# Patient Record
Sex: MALE | Race: WHITE | NOT HISPANIC OR LATINO
[De-identification: names, ages, dates, MRNs, and addresses within clinical notes are randomized per-mention and may not be internally consistent; named-entity substitution may affect disease eponyms.]

---

## 2019-01-23 PROBLEM — Z00.00 ENCOUNTER FOR PREVENTIVE HEALTH EXAMINATION: Status: ACTIVE | Noted: 2019-01-23

## 2019-01-30 ENCOUNTER — APPOINTMENT (OUTPATIENT)
Dept: CARDIOLOGY | Facility: CLINIC | Age: 59
End: 2019-01-30
Payer: COMMERCIAL

## 2019-01-30 VITALS
OXYGEN SATURATION: 97 % | TEMPERATURE: 97.3 F | HEART RATE: 53 BPM | DIASTOLIC BLOOD PRESSURE: 79 MMHG | BODY MASS INDEX: 30.58 KG/M2 | HEIGHT: 70.5 IN | SYSTOLIC BLOOD PRESSURE: 138 MMHG | WEIGHT: 216 LBS

## 2019-01-30 DIAGNOSIS — Z83.79 FAMILY HISTORY OF OTHER DISEASES OF THE DIGESTIVE SYSTEM: ICD-10-CM

## 2019-01-30 DIAGNOSIS — M19.90 UNSPECIFIED OSTEOARTHRITIS, UNSPECIFIED SITE: ICD-10-CM

## 2019-01-30 DIAGNOSIS — Z86.69 PERSONAL HISTORY OF OTHER DISEASES OF THE NERVOUS SYSTEM AND SENSE ORGANS: ICD-10-CM

## 2019-01-30 DIAGNOSIS — Z80.9 FAMILY HISTORY OF MALIGNANT NEOPLASM, UNSPECIFIED: ICD-10-CM

## 2019-01-30 DIAGNOSIS — Z86.79 PERSONAL HISTORY OF OTHER DISEASES OF THE CIRCULATORY SYSTEM: ICD-10-CM

## 2019-01-30 DIAGNOSIS — Z82.49 FAMILY HISTORY OF ISCHEMIC HEART DISEASE AND OTHER DISEASES OF THE CIRCULATORY SYSTEM: ICD-10-CM

## 2019-01-30 DIAGNOSIS — Z84.89 FAMILY HISTORY OF OTHER SPECIFIED CONDITIONS: ICD-10-CM

## 2019-01-30 DIAGNOSIS — Z86.39 PERSONAL HISTORY OF OTHER ENDOCRINE, NUTRITIONAL AND METABOLIC DISEASE: ICD-10-CM

## 2019-01-30 DIAGNOSIS — Z87.39 PERSONAL HISTORY OF OTHER DISEASES OF THE MUSCULOSKELETAL SYSTEM AND CONNECTIVE TISSUE: ICD-10-CM

## 2019-01-30 DIAGNOSIS — Z86.19 PERSONAL HISTORY OF OTHER INFECTIOUS AND PARASITIC DISEASES: ICD-10-CM

## 2019-01-30 DIAGNOSIS — Z87.891 PERSONAL HISTORY OF NICOTINE DEPENDENCE: ICD-10-CM

## 2019-01-30 PROCEDURE — 99215 OFFICE O/P EST HI 40 MIN: CPT

## 2019-01-30 PROCEDURE — 93000 ELECTROCARDIOGRAM COMPLETE: CPT

## 2019-02-03 ENCOUNTER — NON-APPOINTMENT (OUTPATIENT)
Age: 59
End: 2019-02-03

## 2019-02-03 PROBLEM — Z86.79 HISTORY OF LEFT BUNDLE BRANCH BLOCK (LBBB): Status: RESOLVED | Noted: 2019-02-03 | Resolved: 2019-02-03

## 2019-02-03 PROBLEM — Z86.39 HISTORY OF HYPERLIPIDEMIA: Status: RESOLVED | Noted: 2019-02-03 | Resolved: 2019-02-03

## 2019-02-03 PROBLEM — Z80.9 FAMILY HISTORY OF MALIGNANT NEOPLASM: Status: ACTIVE | Noted: 2019-02-03

## 2019-02-03 PROBLEM — M19.90 OSTEOARTHRITIS: Status: RESOLVED | Noted: 2019-02-03 | Resolved: 2019-02-03

## 2019-02-03 PROBLEM — Z86.69 HISTORY OF SLEEP APNEA: Status: RESOLVED | Noted: 2019-02-03 | Resolved: 2019-02-03

## 2019-02-03 PROBLEM — Z87.891 FORMER SMOKER: Status: ACTIVE | Noted: 2019-02-03

## 2019-02-03 PROBLEM — Z87.39 HISTORY OF GANGLION CYST: Status: RESOLVED | Noted: 2019-02-03 | Resolved: 2019-02-03

## 2019-02-03 PROBLEM — Z86.19 HISTORY OF HERPES GENITALIS: Status: RESOLVED | Noted: 2019-02-03 | Resolved: 2019-02-03

## 2019-02-03 PROBLEM — Z82.49 FAMILY HISTORY OF VALVULAR HEART DISEASE: Status: ACTIVE | Noted: 2019-02-03

## 2019-02-03 PROBLEM — Z86.69 HISTORY OF TREMOR: Status: RESOLVED | Noted: 2019-02-03 | Resolved: 2019-02-03

## 2019-02-03 PROBLEM — Z83.79 FAMILY HISTORY OF INFLAMMATORY BOWEL DISEASE: Status: ACTIVE | Noted: 2019-02-03

## 2019-02-03 PROBLEM — Z86.79 HISTORY OF ESSENTIAL HYPERTENSION: Status: RESOLVED | Noted: 2019-02-03 | Resolved: 2019-02-03

## 2019-02-03 PROBLEM — Z86.39 HISTORY OF OBESITY: Status: RESOLVED | Noted: 2019-02-03 | Resolved: 2019-02-03

## 2019-02-03 PROBLEM — Z82.49 FAMILY HISTORY OF HYPERTENSION: Status: ACTIVE | Noted: 2019-02-03

## 2019-02-03 PROBLEM — Z84.89 FAMILY HISTORY OF NEOPLASM OF BRAIN: Status: ACTIVE | Noted: 2019-02-03

## 2019-02-03 RX ORDER — MULTIVITAMIN
TABLET ORAL
Refills: 0 | Status: ACTIVE | COMMUNITY

## 2019-02-03 RX ORDER — VALACYCLOVIR HYDROCHLORIDE 1 G/1
TABLET, FILM COATED ORAL
Refills: 0 | Status: ACTIVE | COMMUNITY

## 2019-02-03 RX ORDER — ASPIRIN 325 MG/1
TABLET, FILM COATED ORAL
Refills: 0 | Status: ACTIVE | COMMUNITY

## 2019-02-03 NOTE — PHYSICAL EXAM
[Normal Conjunctiva] : the conjunctiva exhibited no abnormalities [Auscultation Breath Sounds / Voice Sounds] : lungs were clear to auscultation bilaterally [Heart Rate And Rhythm] : heart rate and rhythm were normal [Heart Sounds] : normal S1 and S2 [Murmurs] : no murmurs present [Arterial Pulses Normal] : the arterial pulses were normal [Edema] : no peripheral edema present [Bowel Sounds] : normal bowel sounds [Abdomen Soft] : soft [Abnormal Walk] : normal gait [Cyanosis, Localized] : no localized cyanosis [] : no rash [Affect] : the affect was normal [FreeTextEntry1] : pleasant

## 2019-02-03 NOTE — HISTORY OF PRESENT ILLNESS
[FreeTextEntry1] : 58-year-old man\par Routine followup\par "I feel great" Mr. Meyer denies chest pain, shortness of breath, palpitations, ankle edema orthopnea or syncope. He is physically active exercising regularly without restriction or limitation.Occasional palpitations have not been progressive or severe.

## 2019-02-03 NOTE — DISCUSSION/SUMMARY
[FreeTextEntry1] : Atherosclerotic heart disease\par Atherosclerotic heart disease is stable. A 10/09 coronary arteriogram demonstrated a 50% LAD stenosis. The left circumflex and RCA were patent. The left ventriculogram demonstrated an ejection fraction of 57%. A 6/15 Riccardo can sestamibi study was normal. Left ventricular systolic function declined as reflected by a left ventricle ejection fraction of 40% on the gated sestamibi study. The left ventricular ejection fraction was 42% on the 7/15 echocardiogram. The left ventricle was dilated with an end-diastolic dimension of 6.1 cm.. In view of the lack of angina, above noninvasive studies and clinical course continued medical management is indicated at this time.\par \par I have recommended the following:\par 1 risk factor modification\par 2 continue present medical regimen\par 3 no further cardiac testing for this problem at this time\par \par \par \par Cardiomyopathy\par The working diagnosis is a nonischemic dilated cardiomyopathy. The degree of coronary disease  (10/09 coronary arteriogram 50% LAD patent circumflex and RCA) is inadequate to explain the degree of left ventricular systolic dysfunction. Left ventricular systolic function is depressed as reflected by a left ventricular ejection fraction of 40% on the 6/15 gated sestamibi study and 42% on the 7/15 echocardiogram. The left ventricle is dilated as indicated by a left ventricular end-diastolic dimension of 6.1 cm on the 7/15 echocardiogram.There is no clinical congestive heart failure. Beta blocker and ACE-I therapy are attractive. The doses of both enalapril and carvedilol may be increased if required. Echocardiography would be helpful to reassess left ventricular and valvular function.\par \par I have recommended the following:\par 1 continue present medical regimen\par 2 echocardiogram with next office evaluation\par \par \par Hypertension\par Hypertension is controlled on the present medical regimen. In the setting of left ventricular systolic dysfunction beta blocker and ACE-I./ARB therapy are attractive. The doses of both carvedilol and enalapril may be increased if required to obtain optimal levels.\par \par I have recommended the following:\par 1 low salt low fat low cholesterol diet. Regular aerobic exercise\par 2 continue present medical regimen\par 3 increase carvedilol and/ or enalapril doses if required to maintain optimal levels as discussed above.\par \par \par Hyperlipidemia\par Hyperlipidemia represents a risk factor for progressive atherosclerotic disease. The target LDL level with known atherosclerotic heart disease is about 70. HMG CoA reductase  inhibitor therapy has been effective. In 11/18  the  serum cholesterol level was 163 HDL 47 LDL 92 and triglycerides 143. The dose of Crestor may be increased if required to obtain optimal levels. Non-pharmacological therapy, specifically diet exercise and weight loss are emphasized as major aspects of treatment.\par \par I have recommended the following:\par 1 low salt low fat low cholesterol diet. Regular aerobic exercise\par 2 continue present medical regimen\par 3 target LDL level to about 70 as discussed above\par 4 increase Crestor dose if required to obtain optimal levels as noted above\par 5 routine laboratory studies including lipid profile through primary care Dr. Oviedo

## 2019-08-20 ENCOUNTER — APPOINTMENT (OUTPATIENT)
Dept: CARDIOLOGY | Facility: CLINIC | Age: 59
End: 2019-08-20
Payer: COMMERCIAL

## 2019-08-20 PROCEDURE — 93306 TTE W/DOPPLER COMPLETE: CPT

## 2019-08-23 ENCOUNTER — APPOINTMENT (OUTPATIENT)
Dept: CARDIOLOGY | Facility: CLINIC | Age: 59
End: 2019-08-23
Payer: COMMERCIAL

## 2019-08-23 VITALS
DIASTOLIC BLOOD PRESSURE: 80 MMHG | OXYGEN SATURATION: 97 % | WEIGHT: 219 LBS | BODY MASS INDEX: 30.98 KG/M2 | SYSTOLIC BLOOD PRESSURE: 120 MMHG | HEART RATE: 59 BPM

## 2019-08-23 DIAGNOSIS — Z78.9 OTHER SPECIFIED HEALTH STATUS: ICD-10-CM

## 2019-08-23 PROCEDURE — 93000 ELECTROCARDIOGRAM COMPLETE: CPT

## 2019-08-23 PROCEDURE — 99215 OFFICE O/P EST HI 40 MIN: CPT

## 2019-08-25 ENCOUNTER — NON-APPOINTMENT (OUTPATIENT)
Age: 59
End: 2019-08-25

## 2019-08-25 PROBLEM — Z78.9 SOCIAL ALCOHOL USE: Status: ACTIVE | Noted: 2019-08-25

## 2019-08-25 RX ORDER — CARVEDILOL 3.12 MG/1
TABLET, FILM COATED ORAL
Refills: 0 | Status: ACTIVE | COMMUNITY

## 2019-08-25 NOTE — DISCUSSION/SUMMARY
[FreeTextEntry1] : Atherosclerotic heart disease\par Atherosclerotic heart disease is stable. A 10/09 coronary arteriogram demonstrated a 50% LAD stenosis. The left circumflex and RCA were patent. The left ventriculogram demonstrated an ejection fraction of 57%. A 6/15 Riccardo can sestamibi study was normal. Left ventricular systolic function declined as reflected by a left ventricle ejection fraction of 40% on the 6/15  gated sestamibi study. The left ventricular ejection fraction was 41% on the 8/19  echocardiogram. The left ventricle was dilated with an end-diastolic dimension of 6.0cm.. In view of the lack of angina, above noninvasive studies and clinical course continued medical management is indicated at this time.\par \par I have recommended the following:\par 1 risk factor modification\par 2 continue present medical regimen\par 3 no further cardiac testing for this problem at this time\par 4. Anticipate stress nuclear study or CT coronary angiogram 2020\par \par \par \par Cardiomyopathy\par The working diagnosis is a nonischemic dilated cardiomyopathy. The degree of coronary disease  (10/09 coronary arteriogram 50% LAD patent circumflex and RCA) is inadequate to explain the degree of left ventricular systolic dysfunction. Left ventricular systolic function is depressed as reflected by a left ventricular ejection fraction of 40% on the 6/15 gated sestamibi study and 41% on the 8/19 echocardiogram. The left ventricle is dilated as indicated by a left ventricular end-diastolic dimension of 6.0 cm on the 8/19 echocardiogram.There is no clinical congestive heart failure. Beta blocker and ACE-I therapy are attractive. The doses of both enalapril and carvedilol may be increased if required. \par \par I have recommended the following:\par 1 continue present medical regimen\par 2 Anticipate stress nuclear study or CT coronary angiogram 2020\par \par \par \par \par Hypertension\par Hypertension is controlled on the present medical regimen. In the setting of left ventricular systolic dysfunction beta blocker and ACE-I./ARB therapy are attractive. The doses of both carvedilol and enalapril may be increased if required to obtain optimal levels.\par \par I have recommended the following:\par 1 low salt low fat low cholesterol diet. Regular aerobic exercise\par 2 continue present medical regimen\par 3 increase carvedilol and/ or enalapril doses if required to maintain optimal levels as discussed above.\par \par \par \par \par Hyperlipidemia\par Hyperlipidemia represents a risk factor for progressive atherosclerotic disease. The target LDL level with known atherosclerotic heart disease is about 70. HMG CoA reductase  inhibitor therapy has been effective. In 11/18  the  serum cholesterol level was 163 HDL 47 LDL 92 and triglycerides 143. The dose of Crestor may be increased if required to obtain optimal levels. Non-pharmacological therapy, specifically diet exercise and weight loss are emphasized as major aspects of treatment.\par \par I have recommended the following:\par 1 low salt low fat low cholesterol diet. Regular aerobic exercise\par 2 continue present medical regimen\par 3 target LDL level to about 70 as discussed above\par 4 increase Crestor dose if required to obtain optimal levels as noted above\par 5 routine laboratory studies including lipid profile through primary care Dr. Nash

## 2019-08-25 NOTE — HISTORY OF PRESENT ILLNESS
[FreeTextEntry1] : 58-year-old man\par Routine followup\par "I feel terrific." AJ has increased his activity/exercise with walking and biking. The right knee pain following a steroid injection has markedly improved he specifically denies chest pain, palpitations, shortness of breath or syncope.

## 2020-01-13 ENCOUNTER — RESULT REVIEW (OUTPATIENT)
Age: 60
End: 2020-01-13

## 2020-01-16 ENCOUNTER — APPOINTMENT (OUTPATIENT)
Dept: CARDIOLOGY | Facility: CLINIC | Age: 60
End: 2020-01-16
Payer: COMMERCIAL

## 2020-01-16 PROCEDURE — 93306 TTE W/DOPPLER COMPLETE: CPT

## 2020-01-22 ENCOUNTER — NON-APPOINTMENT (OUTPATIENT)
Age: 60
End: 2020-01-22

## 2020-01-22 ENCOUNTER — APPOINTMENT (OUTPATIENT)
Dept: CARDIOLOGY | Facility: CLINIC | Age: 60
End: 2020-01-22
Payer: COMMERCIAL

## 2020-01-22 VITALS
SYSTOLIC BLOOD PRESSURE: 110 MMHG | HEART RATE: 63 BPM | WEIGHT: 222 LBS | OXYGEN SATURATION: 96 % | DIASTOLIC BLOOD PRESSURE: 68 MMHG | BODY MASS INDEX: 31.4 KG/M2

## 2020-01-22 PROCEDURE — 36415 COLL VENOUS BLD VENIPUNCTURE: CPT

## 2020-01-22 PROCEDURE — 99215 OFFICE O/P EST HI 40 MIN: CPT

## 2020-01-22 PROCEDURE — 93000 ELECTROCARDIOGRAM COMPLETE: CPT

## 2020-01-23 NOTE — PHYSICAL EXAM
[Normal Conjunctiva] : the conjunctiva exhibited no abnormalities [Auscultation Breath Sounds / Voice Sounds] : lungs were clear to auscultation bilaterally [Heart Rate And Rhythm] : heart rate and rhythm were normal [Murmurs] : no murmurs present [Heart Sounds] : normal S1 and S2 [Arterial Pulses Normal] : the arterial pulses were normal [Edema] : no peripheral edema present [Bowel Sounds] : normal bowel sounds [Abdomen Soft] : soft [Abnormal Walk] : normal gait [Cyanosis, Localized] : no localized cyanosis [] : no ischemic changes [Affect] : the affect was normal [FreeTextEntry1] : feet warm, well. dorsalis pedis pulses +2 bilaterally

## 2020-01-23 NOTE — REVIEW OF SYSTEMS
[Feeling Fatigued] : feeling fatigued [Eyeglasses] : currently wearing eyeglasses [see HPI] : see HPI [Joint Pain] : joint pain [Negative] : Psychiatric [Shortness Of Breath] : shortness of breath

## 2020-01-23 NOTE — DISCUSSION/SUMMARY
[FreeTextEntry1] : Cardiomyopathy\par The working diagnosis is a nonischemic dilated cardiomyopathy. The degree of coronary disease  (10/09 coronary arteriogram 50% LAD patent circumflex and RCA normal perfusion 1/20 Lexiscan sesta mibi study )  is inadequate to explain the degree of left ventricular systolic dysfunction. Left ventricular systolic function is depressed as reflected by a left ventricular ejection fraction of  28 % on the 1/20  gated sestamibi study and 40% on the  1/20  echocardiogram. The left ventricle is dilated as indicated by a left ventricular end-diastolic dimension of 6.0 cm on the  1/20  echocardiogram.There is no clinical congestive heart failure. Beta blocker and ACE-I /ARB  therapy are attractive. Recent data have demonstrated the benefit of Neprilysin inhibition in patients with significant left ventricular systolic dysfunction . \par The presence of a left bundle branch block with a wide QRS complex (164 ms) would indicate a favorable response to resynchronization therapy/biventricular pacemaker implantation .\par \par I have recommended the following:\par 1 Discontinue enalapril\par 2 Entresto 49/51mg bid with further dose adjustment dictated by tolerance and response along with monitoring of electrolytes and renal function\par 3. Consideration for biventricular pacemaker in the future depending on the patient's clinical course, response to the above measures.\par \par \par \par Atherosclerotic heart disease\par Atherosclerotic heart disease is stable. A 10/09 coronary arteriogram demonstrated a 50% LAD stenosis. The left circumflex and RCA were patent. The left ventriculogram demonstrated an ejection fraction of 57%.  A1/20 Lexiscan sestamibi study demonstrated normal perfusion with no ischemia or infarct.l. Left ventricular systolic function declined as reflected by a left ventricle ejection fraction of 28 % on the 1/20   gated sestamibi study and 40 % on the 1/20 echocardiogram.. In view of the lack of angina, above noninvasive studies and clinical course continued medical management is indicated at this time.\par \par I have recommended the following:\par 1 risk factor modification\par 2 continue present medical regimen\par 3 no further cardiac testing for this problem at this time\par \par \par \par \par \par Left bundle branch block\par Mr. Meyer has a known chronic left bundle branch block. The presence of a left bundle branch block is common in the setting of a cardiomyopathy. As previously noted a left bundle branch block with wide QRS duration ( 164 ms) would indicate a high likelihood of a favorable response to resynchronization therapy/biventricular pacemaker implantation.\par \par I have recommended the following\par 1. Consideration for biventricular pacemaker implantation dependent on the patient's clinical course as discussed above.\par \par \par Hypertension\par Hypertension is controlled on the present medical regimen. In the setting of left ventricular systolic dysfunction beta blocker and ACE-I./ARB and Neprilysin inhibition   therapy are attractive. .\par \par I have recommended the following:\par 1 low salt low fat low cholesterol diet. Regular aerobic exercise\par 2 Entreso to replace enalapril as discussed above\par .\par \par \par \par \par Hyperlipidemia\par Hyperlipidemia represents a risk factor for progressive atherosclerotic disease. The target LDL level with known atherosclerotic heart disease is about 70. HMG CoA reductase  inhibitor therapy has been effective. In 12/19  the  serum cholesterol level was 204 HDL 52  LDL 93  and triglycerides 293. The dose of Crestor may be increased in an effort  to obtain optimal levels. Non-pharmacological therapy, specifically diet exercise and weight loss are emphasized as major aspects of treatment.\par \par I have recommended the following:\par 1 low salt low fat low cholesterol diet. Regular aerobic exercise\par 2 Increase Crestor from 20 to 40 mg daily\par 3 target LDL level to about 70 as discussed above\par 4 routine laboratory studies including lipid profile through primary care Dr. Nash\par \par Obesity\par Obesity exacerbates Adarsh's cardiovascular issues.. Today Mr. Meyer is 5 feet 9 inches tall and weighs 222 lbs. he is gained 3 pounds in the last 5 months. Diet exercise and weight loss are advised.\par \par \par \par The diagnosis, prognosis, risks options and alternatives were explained at length to the patient. All questions were answered. Issues discussed included cardio myopathy/left ventricular systolic dysfunction, heart failure, hyperlipidemia and hypertension.\par Counseling and coordination of care\par Time was a significant factor for this patient encounter. Total time spent with the patient was 40 minutes. 50% of the time was devoted to counseling and coordination of care.

## 2020-01-23 NOTE — HISTORY OF PRESENT ILLNESS
[FreeTextEntry1] : 59-year-old man\par Routine visit following noninvasive cardiac studies\par \par 1/20 Lexiscan sestamibi study demonstrated no ischemia or infarct. Left ejection fraction 28%\par 1/20 echocardiogram left ventricular end-diastolic dimension 5.98 cm left ventricular l ejection fraction 40%\par \par  Mitch complains of mild dyspnea on exertion. No chest pain. No palpitations. No ankle edema. No syncope.

## 2020-03-02 ENCOUNTER — APPOINTMENT (OUTPATIENT)
Dept: CARDIOLOGY | Facility: CLINIC | Age: 60
End: 2020-03-02

## 2020-03-03 ENCOUNTER — APPOINTMENT (OUTPATIENT)
Dept: CARDIOLOGY | Facility: CLINIC | Age: 60
End: 2020-03-03
Payer: COMMERCIAL

## 2020-03-03 VITALS
BODY MASS INDEX: 32.11 KG/M2 | WEIGHT: 227 LBS | HEART RATE: 55 BPM | SYSTOLIC BLOOD PRESSURE: 114 MMHG | OXYGEN SATURATION: 97 % | DIASTOLIC BLOOD PRESSURE: 70 MMHG

## 2020-03-03 PROCEDURE — 99215 OFFICE O/P EST HI 40 MIN: CPT

## 2020-03-04 NOTE — HISTORY OF PRESENT ILLNESS
[FreeTextEntry1] : 59-year-old man\par Routine followup\par "I feel okay "  Breathing seems to be "a little bit better "  No chest pain. No palpitations. No ankle edema. No orthopnea. No syncope. No dizziness\par

## 2020-03-04 NOTE — DISCUSSION/SUMMARY
[FreeTextEntry1] : Cardiomyopathy\par The working diagnosis is a nonischemic dilated cardiomyopathy. The degree of coronary disease  (10/09 coronary arteriogram 50% LAD patent circumflex and RCA normal perfusion 1/20 Lexiscan sesta mibi study )  is inadequate to explain the degree of left ventricular systolic dysfunction. Left ventricular systolic function is depressed as reflected by a left ventricular ejection fraction of  28 % on the 1/20  gated sestamibi study and 40% on the  1/20  echocardiogram. The left ventricle is dilated as indicated by a left ventricular end-diastolic dimension of 6.0 cm on the  1/20  echocardiogram.There is no clinical congestive heart failure. Beta blocker and ACE-I /ARB  therapy are attractive. Recent data have demonstrated the benefit of Neprilysin inhibition in patients with significant left ventricular systolic dysfunction .\par Relatively low blood pressure levels may make higher doses of Entresto problematic. \par The presence of a left bundle branch block with a wide QRS complex (164 ms) would indicate a favorable response to resynchronization therapy/biventricular pacemaker implantation .\par \par I have recommended the following:\par 1 Continue present medical regimen at this time\par 2. Consideration for increase Entresto  dose to 97/103 bid  dependent on the patient's hemodynamics and clinical course\par 3. Consideration for biventricular pacemaker in the future depending on the patient's clinical course, response to the above measures.\par \par \par \par Atherosclerotic heart disease\par Atherosclerotic heart disease is stable. A 10/09 coronary arteriogram demonstrated a 50% LAD stenosis. The left circumflex and RCA were patent. The left ventriculogram demonstrated an ejection fraction of 57%.  A1/20 Lexiscan sestamibi study demonstrated normal perfusion with no ischemia or infarct.l. Left ventricular systolic function declined as reflected by a left ventricle ejection fraction of 28 % on the 1/20   gated sestamibi study and 40 % on the 1/20 echocardiogram.. In view of the lack of angina, above noninvasive studies and clinical course continued medical management is indicated at this time.\par \par I have recommended the following:\par 1 risk factor modification\par 2 continue present medical regimen\par 3 no further cardiac testing for this problem at this time\par \par \par \par \par \par Left bundle branch block\par Mr. Meyer has a known chronic left bundle branch block. The presence of a left bundle branch block is common in the setting of a cardiomyopathy. As previously noted a left bundle branch block with wide QRS duration ( 164 ms) would indicate a high likelihood of a favorable response to resynchronization therapy/biventricular pacemaker implantation.\par \par I have recommended the following\par 1. Consideration for biventricular pacemaker implantation dependent on the patient's clinical course as discussed above.\par \par \par Hypertension\par Hypertension is controlled on the present medical regimen. In the setting of left ventricular systolic dysfunction beta blocker and ACE-I./ARB and Neprilysin inhibition   therapy are attractive. .\par \par I have recommended the following:\par 1 low salt low fat low cholesterol diet. Regular aerobic exercise\par 2 continue the present  medical regimen.\par \par \par \par \par Hyperlipidemia\par Hyperlipidemia represents a risk factor for progressive atherosclerotic disease. The target LDL level with known atherosclerotic heart disease is about 70. HMG CoA reductase  inhibitor therapy has been effective. In 12/19  the  serum cholesterol level was 204 HDL 52  LDL 93  and triglycerides 293. Non pharmacological  therapy, specifically diet exercise and weight loss are emphasized as major aspects of treatment.\par \par I have recommended the following:\par 1 low salt low fat low cholesterol diet. Regular aerobic exercise\par 2 continue the present medical regimen\par 3 routine laboratory studies including lipid profile through primary care Dr. Nash\par \par Obesity\par Obesity exacerbates Adarsh's cardiovascular issues.. Today Mr. Meyer is 5 feet 9 inches tall and weighs 227 lbs. he is gained 5   pounds in the last 6 weeks  and 8 pounds in the last 7 months.. Diet exercise and weight loss are advised.\par \par \par \par The diagnosis, prognosis, risks options and alternatives were explained at length to the patient. All questions were answered. Issues discussed included cardio myopathy/left ventricular systolic dysfunction, heart failure, hyperlipidemia and hypertension.\par Counseling and coordination of care\par Time was a significant factor for this patient encounter. Total time spent with the patient was 40 minutes. 50% of the time was devoted to counseling and coordination of care.

## 2020-04-28 ENCOUNTER — APPOINTMENT (OUTPATIENT)
Dept: CARDIOLOGY | Facility: CLINIC | Age: 60
End: 2020-04-28
Payer: COMMERCIAL

## 2020-04-28 VITALS
SYSTOLIC BLOOD PRESSURE: 127 MMHG | WEIGHT: 231.06 LBS | DIASTOLIC BLOOD PRESSURE: 81 MMHG | HEART RATE: 51 BPM | OXYGEN SATURATION: 97 % | BODY MASS INDEX: 32.69 KG/M2

## 2020-04-28 PROCEDURE — 99215 OFFICE O/P EST HI 40 MIN: CPT

## 2020-04-28 PROCEDURE — 93000 ELECTROCARDIOGRAM COMPLETE: CPT

## 2020-04-29 ENCOUNTER — NON-APPOINTMENT (OUTPATIENT)
Age: 60
End: 2020-04-29

## 2020-04-29 RX ORDER — FLUTICASONE PROPIONATE 50 MCG
50 SPRAY, SUSPENSION NASAL
Refills: 0 | Status: ACTIVE | COMMUNITY

## 2020-04-29 NOTE — HISTORY OF PRESENT ILLNESS
[FreeTextEntry1] : 59-year-old man\par Unscheduled visit\par Adarsh called today with complaints of chest pain and shortness of breath. 7-10 days ago he was treated by his primary care physician Dr. Nash for sinus congestion/allergies manifested by nasal and upper chest "congestion. "  The last 2 days he is experiencing shortness of breath on exertion and chest "pressure"\par No ankle edema. No orthopnea. No palpitations. No syncope.  No cough. No fever.

## 2020-04-29 NOTE — DISCUSSION/SUMMARY
[FreeTextEntry1] : Chest pain/shortness of breath\par The working diagnosis is musculoskeletal chest pain and dyspnea secondary to deconditioning and seasonal allergies. The history is consistent with this diagnosis. The differential diagnosis would include congestive heart failure secondary to heart failure with reduced ejection fraction (28% 1/20 gated  sestamibi study and 40% 1/20 echocardiogram) secondary to a nonischemic cardiomyopathy. However, the physical findings do not support this diagnosis. A 1/20 lexiscan sestamibi study demonstrated normal perfusion arguing against the presence of acute myocardial ischemia as a cause for the patient's symptoms The presence of a left bundle branch block precludes ECG assessment regarding acute myocardial ischemia.  The presence of the corona virus pandemic will  influence  decisions  regarding timing of cardiac testing.\par \par \par I have recommended the following:\par 1 continue present medical regimen\par 2. Low salt low fat low caloric diet. Regular aerobic exercise and weight loss\par 3. No further cardiac testing for this problem at this time\par 4. 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 The degree of coronary disease  (10/09 coronary arteriogram 50% LAD patent circumflex and RCA normal perfusion 1/20 Lexiscan sesta mibi study )  is inadequate to explain the degree of left ventricular systolic dysfunction. Left ventricular systolic function is depressed as reflected by a left ventricular ejection fraction of  28 % on the 1/20  gated sestamibi study and 40% on the  1/20  echocardiogram. The left ventricle is dilated as indicated by a left ventricular end-diastolic dimension of 6.0 cm on the  1/20  echocardiogram.There is no clinical congestive heart failure. Beta blocker and ACE-I /ARB  therapy are attractive. Recent data have demonstrated the benefit of Neprilysin inhibition in patients with significant left ventricular systolic dysfunction .\par Relatively low blood pressure levels may make higher doses of Entresto problematic. \par The presence of a left bundle branch block with a wide QRS complex (164 ms) would indicate a favorable response to resynchronization therapy/biventricular pacemaker implantation .\par \par I have recommended the following:\par 1 Continue present medical regimen at this time\par 2. Consideration for increase Entresto  dose to 97/103 bid  dependent on the patient's hemodynamics and clinical course\par 3. Consideration for biventricular pacemaker in the future depending on the patient's clinical course, response to the above measures.\par 4. anticipate echocardiogram later 2020\par \par \par \par Atherosclerotic heart disease\par Atherosclerotic heart disease is stable. A 10/09 coronary arteriogram demonstrated a 50% LAD stenosis. The left circumflex and RCA were patent. The left ventriculogram demonstrated an ejection fraction of 57%.  A1/20 Lexiscan sestamibi study demonstrated normal perfusion with no ischemia or infarct.l. Left ventricular systolic function declined as reflected by a left ventricle ejection fraction of 28 % on the 1/20   gated sestamibi study and 40 % on the 1/20 echocardiogram.. In view of the lack of angina, above noninvasive studies and clinical course continued medical management is indicated at this time.\par \par I have recommended the following:\par 1 risk factor modification\par 2 continue present medical regimen\par 3 no further cardiac testing for this problem at this time\par 4.anticipate echocardiogram later 2020.\par \par \par \par \par \par Left bundle branch block\par Mr. Meyer has a known chronic left bundle branch block. The presence of a left bundle branch block is common in the setting of a cardiomyopathy. As previously noted a left bundle branch block with wide QRS duration ( 152 ms) would indicate a high likelihood of a favorable response to resynchronization therapy/biventricular pacemaker implantation.\par \par I have recommended the following\par 1. Consideration for biventricular pacemaker implantation dependent on the patient's clinical course as discussed above.\par \par \par Hypertension\par Hypertension is controlled on the present medical regimen. In the setting of left ventricular systolic dysfunction beta blocker and ACE-I./ARB and Neprilysin inhibition   therapy are attractive. .\par \par I have recommended the following:\par 1 low salt low fat low cholesterol diet. Regular aerobic exercise\par 2 continue the present  medical regimen.\par \par \par \par \par Hyperlipidemia\par Hyperlipidemia represents a risk factor for progressive atherosclerotic disease. The target LDL level with known atherosclerotic heart disease is about 70. HMG CoA reductase  inhibitor therapy has been effective. In 12/19  the  serum cholesterol level was 204 HDL 52  LDL 93  and triglycerides 293. Non pharmacological  therapy, specifically diet exercise and weight loss are emphasized as major aspects of treatment.\par \par I have recommended the following:\par 1 low salt low fat low cholesterol diet. Regular aerobic exercise\par 2 continue the present medical regimen\par 3 routine laboratory studies including lipid profile through primary care Dr. Nash\par \par Obesity\par Obesity exacerbates Adarsh's cardiovascular issues.. Today Mr. Meyer is 5 feet 9 inches tall and weighs 231  lbs. he is gained 4   pounds in the last 6 weeks  and  12   pounds in the last  9  months.. Diet exercise and weight loss are advised.\par \par \par \par The diagnosis, prognosis, risks options and alternatives were explained at length to the patient. All questions were answered. Issues discussed included cardio myopathy/left ventricular systolic dysfunction, heart failure, hyperlipidemia and hypertension.\par Counseling and coordination of care\par Time was a significant factor for this patient encounter. Total time spent with the patient was 40 minutes. 50% of the time was devoted to counseling and coordination of care.

## 2020-04-29 NOTE — PHYSICAL EXAM
[Normal Conjunctiva] : the conjunctiva exhibited no abnormalities [Auscultation Breath Sounds / Voice Sounds] : lungs were clear to auscultation bilaterally [Heart Rate And Rhythm] : heart rate and rhythm were normal [Heart Sounds] : normal S1 and S2 [Arterial Pulses Normal] : the arterial pulses were normal [Murmurs] : no murmurs present [Edema] : no peripheral edema present [Abdomen Soft] : soft [Bowel Sounds] : normal bowel sounds [Abnormal Walk] : normal gait [Cyanosis, Localized] : no localized cyanosis [Affect] : the affect was normal [] : no rash [FreeTextEntry1] : pleasant

## 2020-06-03 ENCOUNTER — APPOINTMENT (OUTPATIENT)
Dept: CARDIOLOGY | Facility: CLINIC | Age: 60
End: 2020-06-03
Payer: COMMERCIAL

## 2020-06-03 VITALS
WEIGHT: 229 LBS | OXYGEN SATURATION: 96 % | SYSTOLIC BLOOD PRESSURE: 128 MMHG | BODY MASS INDEX: 32.39 KG/M2 | HEART RATE: 62 BPM | DIASTOLIC BLOOD PRESSURE: 80 MMHG

## 2020-06-03 PROCEDURE — 99214 OFFICE O/P EST MOD 30 MIN: CPT

## 2020-06-04 NOTE — DISCUSSION/SUMMARY
[FreeTextEntry1] : Cardiomyopathy\par The working diagnosis is a nonischemic dilated cardiomyopathy. The degree of coronary disease  (10/09 coronary arteriogram 50% LAD patent circumflex and RCA normal perfusion 1/20 Lexiscan sesta mibi study )  is inadequate to explain the degree of left ventricular systolic dysfunction. Left ventricular systolic function is depressed as reflected by a left ventricular ejection fraction of  28 % on the 1/20  gated sestamibi study and 40% on the  1/20  echocardiogram. The left ventricle is dilated as indicated by a left ventricular end-diastolic dimension of 6.0 cm on the  1/20  echocardiogram.There is no clinical congestive heart failure. Beta blocker and ACE-I /ARB  therapy are attractive. Recent data have demonstrated the benefit of Neprilysin inhibition in patients with significant left ventricular systolic dysfunction .\par Relatively low blood pressure levels may make higher doses of Entresto problematic. \par The presence of a left bundle branch block with a wide QRS complex (164 ms) would indicate a favorable response to resynchronization therapy/biventricular pacemaker implantation .\par \par I have recommended the following:\par 1 Continue present medical regimen at this time\par 2. Consideration for increase Entresto  dose to 97/103 bid  dependent on the patient's hemodynamics and clinical course\par 3. Consideration for biventricular pacemaker in the future depending on the patient's clinical course, response to the above measures.\par 4 echocardiogram with next office evaluation in  6 months\par \par \par \par Atherosclerotic heart disease\par Atherosclerotic heart disease is stable. A 10/09 coronary arteriogram demonstrated a 50% LAD stenosis. The left circumflex and RCA were patent. The left ventriculogram demonstrated an ejection fraction of 57%.  A1/20 Lexiscan sestamibi study demonstrated normal perfusion with no ischemia or infarct.l. Left ventricular systolic function declined as reflected by a left ventricle ejection fraction of 28 % on the 1/20   gated sestamibi study and 40 % on the 1/20 echocardiogram.. In view of the lack of angina, above noninvasive studies and clinical course continued medical management is indicated at this time.\par \par I have recommended the following:\par 1 risk factor modification\par 2 continue present medical regimen\par 3 no further cardiac testing for this problem at this time\par 4. echocardiogram with next office evaluation in 6 months.\par \par \par \par \par \par Left bundle branch block\par Mr. Meyer has a known chronic left bundle branch block. The presence of a left bundle branch block is common in the setting of a cardiomyopathy. As previously noted a left bundle branch block with wide QRS duration ( 152 ms) would indicate a high likelihood of a favorable response to resynchronization therapy/biventricular pacemaker implantation.\par \par I have recommended the following\par 1. Consideration for biventricular pacemaker implantation dependent on the patient's clinical course as discussed above.\par \par \par Hypertension\par Hypertension is controlled on the present medical regimen. In the setting of left ventricular systolic dysfunction beta blocker and ACE-I./ARB and Neprilysin inhibition   therapy are attractive. .\par \par I have recommended the following:\par 1 low salt low fat low cholesterol diet. Regular aerobic exercise\par 2 continue the present  medical regimen.\par \par \par \par \par Hyperlipidemia\par Hyperlipidemia represents a risk factor for progressive atherosclerotic disease. The target LDL level with known atherosclerotic heart disease is about 70. HMG CoA reductase  inhibitor therapy has been effective. In 12/19  the  serum cholesterol level was 204 HDL 52  LDL 93  and triglycerides 293. Non pharmacological  therapy, specifically diet exercise and weight loss are emphasized as major aspects of treatment.\par \par I have recommended the following:\par 1 low salt low fat low cholesterol diet. Regular aerobic exercise\par 2 continue the present medical regimen\par 3 routine laboratory studies including lipid profile through primary care Dr. Nash\par \par Obesity\par Obesity exacerbates Adarsh's cardiovascular issues.. Today Mr. Meyer is 5 feet 9 inches tall and weighs 229  lbs. Diet exercise and weight loss are advised.\par \par \par \par The diagnosis, prognosis, risks options and alternatives were explained at length to the patient. All questions were answered. Issues discussed included cardio myopathy/left ventricular systolic dysfunction, heart failure, hyperlipidemia and hypertension.\par \par \par Counseling and coordination of care\par Time was a significant factor for this patient encounter. Total time spent with the patient was  25  minutes. 50% of the time was devoted to counseling and coordination of care.

## 2020-06-04 NOTE — HISTORY OF PRESENT ILLNESS
[FreeTextEntry1] : 59-year-old man\par Routine followup\par "I feel fabulous " AJ has been exercising riding his bicycle for miles without any restriction or limitation. No chest pain. No shortness of breath. No palpitations. No syncope.

## 2021-01-27 ENCOUNTER — APPOINTMENT (OUTPATIENT)
Dept: CARDIOLOGY | Facility: CLINIC | Age: 61
End: 2021-01-27
Payer: COMMERCIAL

## 2021-01-27 ENCOUNTER — NON-APPOINTMENT (OUTPATIENT)
Age: 61
End: 2021-01-27

## 2021-01-27 PROCEDURE — 93306 TTE W/DOPPLER COMPLETE: CPT

## 2021-01-27 PROCEDURE — 99072 ADDL SUPL MATRL&STAF TM PHE: CPT

## 2021-02-12 ENCOUNTER — APPOINTMENT (OUTPATIENT)
Dept: CARDIOLOGY | Facility: CLINIC | Age: 61
End: 2021-02-12
Payer: COMMERCIAL

## 2021-02-12 PROCEDURE — 99441: CPT

## 2021-02-16 ENCOUNTER — NON-APPOINTMENT (OUTPATIENT)
Age: 61
End: 2021-02-16

## 2021-03-16 ENCOUNTER — APPOINTMENT (OUTPATIENT)
Dept: CARDIOLOGY | Facility: CLINIC | Age: 61
End: 2021-03-16

## 2021-03-22 ENCOUNTER — APPOINTMENT (OUTPATIENT)
Dept: CARDIOLOGY | Facility: CLINIC | Age: 61
End: 2021-03-22
Payer: COMMERCIAL

## 2021-03-22 VITALS
WEIGHT: 189 LBS | DIASTOLIC BLOOD PRESSURE: 66 MMHG | BODY MASS INDEX: 26.74 KG/M2 | TEMPERATURE: 97.9 F | SYSTOLIC BLOOD PRESSURE: 122 MMHG | RESPIRATION RATE: 13 BRPM | OXYGEN SATURATION: 99 % | HEART RATE: 54 BPM

## 2021-03-22 PROCEDURE — 99072 ADDL SUPL MATRL&STAF TM PHE: CPT

## 2021-03-22 PROCEDURE — 93000 ELECTROCARDIOGRAM COMPLETE: CPT

## 2021-03-22 PROCEDURE — 99214 OFFICE O/P EST MOD 30 MIN: CPT

## 2021-03-23 ENCOUNTER — NON-APPOINTMENT (OUTPATIENT)
Age: 61
End: 2021-03-23

## 2021-03-23 NOTE — DISCUSSION/SUMMARY
[FreeTextEntry1] : Cardiomyopathy\par The working diagnosis is a nonischemic dilated cardiomyopathy. The degree of coronary disease  (10/09 coronary arteriogram 50% LAD patent circumflex and RCA normal perfusion 1/20 Lexiscan sesta mibi study )  is inadequate to explain the degree of left ventricular systolic dysfunction. Left ventricular systolic function is depressed as reflected by a left ventricular ejection fraction of  28 % on the 1/20  gated sestamibi study and 41% on the  1/21  echocardiogram. There is no clinical congestive heart failure. Beta blocker and ACE-I /ARB  therapy are attractive. Recent data have demonstrated the benefit of Neprilysin inhibition in patients with significant left ventricular systolic dysfunction .\par Relatively low blood pressure levels may make higher doses of Entresto problematic. \par The presence of a left bundle branch block with a wide QRS complex (160 ms) would indicate a favorable response to resynchronization therapy/biventricular pacemaker implantation .\par \par I have recommended the following:\par 1 Continue the  present medical regimen at this time\par 2. Consideration for increase Entresto  dose to 97/103 bid  dependent on the patient's hemodynamics and clinical course\par 3. Consideration for biventricular pacemaker in the future depending on the patient's clinical course, response to the above measures.\par 4. No further cardiac testing for this problem at this time\par \par \par \par Atherosclerotic heart disease\par Atherosclerotic heart disease is stable. A 10/09 coronary arteriogram demonstrated a 50% LAD stenosis. The left circumflex and RCA were patent. The left ventriculogram demonstrated an ejection fraction of 57%.  A1/20 Lexiscan sestamibi study demonstrated normal perfusion with no ischemia or infarct.  Left ventricular systolic function  is depressed  as reflected by a left ventricle ejection fraction of 28 % on the 1/20   gated sestamibi study and 41 % on the 1/21 echocardiogram.. In view of the lack of angina, above noninvasive studies and clinical course continued medical management is indicated at this time.\par \par I have recommended the following:\par 1 risk factor modification\par 2 continue the present medical regimen\par 3 no further cardiac testing for this problem at this time\par \par \par \par \par \par \par Left bundle branch block\par Mr. Meyer has a known chronic left bundle branch block. The presence of a left bundle branch block is common in the setting of a cardiomyopathy. As previously noted a left bundle branch block with wide QRS duration ( 160 ms) would indicate a high likelihood of a favorable response to resynchronization therapy/biventricular pacemaker implantation.\par \par I have recommended the following\par 1. Consideration for biventricular pacemaker implantation dependent on the patient's clinical course as discussed above.\par \par \par Hypertension\par Hypertension is controlled on the present medical regimen. In the setting of left ventricular systolic dysfunction beta blocker and ACE-I./ARB and Neprilysin inhibition    are attractive. .\par \par I have recommended the following:\par 1 low salt low fat low cholesterol diet. Regular aerobic exercise\par 2 continue the present  medical regimen.\par \par \par \par \par Hyperlipidemia\par Hyperlipidemia represents a risk factor for progressive atherosclerotic disease. The target LDL level with known atherosclerotic heart disease is about 70. HMG CoA reductase  inhibitor therapy has been effective. In 12/19  the  serum cholesterol level was 204 HDL 52  LDL 93  and triglycerides 293.  More recent lipid levels are not available for review  . Non pharmacological  therapy, specifically diet exercise and weight loss are emphasized as major aspects of treatment.\par \par I have recommended the following:\par 1 low salt low fat low cholesterol diet. Regular aerobic exercise\par 2 continue the present medical regimen\par 3 routine laboratory studies including lipid profile through primary care Dr. Nash\par \par \par \par Obesity\par Obesity exacerbates Adarsh's cardiovascular issues.. Today Mr. Meyer is 5 feet 9 inches tall and weighs 229  lbs. Diet exercise and weight loss are advised.\par \par \par \par The diagnosis, prognosis, risks options and alternatives were explained at length to the patient. All questions were answered. Issues discussed included cardio myopathy/left ventricular systolic dysfunction, heart failure, hyperlipidemia  hypertension  noninvasive cardiac testing diet and exercise.\par \par \par Counseling and coordination of care\par Time was a significant factor for this patient encounter. Total time spent with the patient was  30   minutes. 50% of the time was devoted to counseling and coordination of care.

## 2021-03-23 NOTE — HISTORY OF PRESENT ILLNESS
[FreeTextEntry1] : 60-year-old man\par Routine followup\par Through dietary changes  AJ  has lost 40 pounds since his last visit here in 6/20. No chest pain. No shortness of breath. No palpitations. No peripheral edema. No syncope.

## 2021-07-28 ENCOUNTER — HOSPITAL ENCOUNTER (EMERGENCY)
Dept: HOSPITAL 74 - FER | Age: 61
Discharge: HOME | End: 2021-07-28
Payer: COMMERCIAL

## 2021-07-28 VITALS — HEART RATE: 86 BPM | SYSTOLIC BLOOD PRESSURE: 132 MMHG | DIASTOLIC BLOOD PRESSURE: 86 MMHG | TEMPERATURE: 100.2 F

## 2021-07-28 VITALS — BODY MASS INDEX: 26.2 KG/M2

## 2021-07-28 DIAGNOSIS — M79.10: ICD-10-CM

## 2021-07-28 DIAGNOSIS — Z11.52: ICD-10-CM

## 2021-07-28 DIAGNOSIS — R50.9: Primary | ICD-10-CM

## 2021-07-28 LAB
ALBUMIN SERPL-MCNC: 4 G/DL (ref 3.4–5)
ALP SERPL-CCNC: 48 U/L (ref 45–117)
ALT SERPL-CCNC: 45 U/L (ref 13–61)
ANION GAP SERPL CALC-SCNC: 11 MMOL/L (ref 8–16)
AST SERPL-CCNC: 55 U/L (ref 15–37)
BILIRUB SERPL-MCNC: 1.2 MG/DL (ref 0.2–1)
BUN SERPL-MCNC: 9 MG/DL (ref 7–18)
CALCIUM SERPL-MCNC: 8.3 MG/DL (ref 8.5–10)
CHLORIDE SERPL-SCNC: 101 MMOL/L (ref 98–107)
CO2 SERPL-SCNC: 21 MMOL/L (ref 21–32)
CREAT SERPL-MCNC: 0.8 MG/DL (ref 0.55–1.3)
DEPRECATED RDW RBC AUTO: 12.6 % (ref 11.9–15.9)
GLUCOSE SERPL-MCNC: 123 MG/DL (ref 74–106)
HCT VFR BLD CALC: 42.2 % (ref 35.4–49)
HGB BLD-MCNC: 14.5 GM/DL (ref 11.7–16.9)
MCH RBC QN AUTO: 33.7 PG (ref 25.7–33.7)
MCHC RBC AUTO-ENTMCNC: 34.3 G/DL (ref 32–35.9)
MCV RBC: 98.2 FL (ref 80–96)
PLATELET # BLD AUTO: 106 10^3/UL (ref 134–434)
PLATELET BLD QL SMEAR: (no result)
PMV BLD: 7.2 FL (ref 7.5–11.1)
PROT SERPL-MCNC: 6.9 G/DL (ref 6.4–8.2)
RBC # BLD AUTO: 4.3 M/MM3 (ref 4–5.6)
SODIUM SERPL-SCNC: 133 MMOL/L (ref 136–145)
WBC # BLD AUTO: 3.5 K/MM3 (ref 4–10.8)

## 2021-07-28 PROCEDURE — U0005 INFEC AGEN DETEC AMPLI PROBE: HCPCS

## 2021-07-28 PROCEDURE — U0003 INFECTIOUS AGENT DETECTION BY NUCLEIC ACID (DNA OR RNA); SEVERE ACUTE RESPIRATORY SYNDROME CORONAVIRUS 2 (SARS-COV-2) (CORONAVIRUS DISEASE [COVID-19]), AMPLIFIED PROBE TECHNIQUE, MAKING USE OF HIGH THROUGHPUT TECHNOLOGIES AS DESCRIBED BY CMS-2020-01-R: HCPCS

## 2021-07-28 PROCEDURE — 3E0337Z INTRODUCTION OF ELECTROLYTIC AND WATER BALANCE SUBSTANCE INTO PERIPHERAL VEIN, PERCUTANEOUS APPROACH: ICD-10-PCS

## 2021-07-28 PROCEDURE — 3E0333Z INTRODUCTION OF ANTI-INFLAMMATORY INTO PERIPHERAL VEIN, PERCUTANEOUS APPROACH: ICD-10-PCS

## 2021-07-28 PROCEDURE — C9803 HOPD COVID-19 SPEC COLLECT: HCPCS

## 2021-07-29 ENCOUNTER — HOSPITAL ENCOUNTER (INPATIENT)
Dept: HOSPITAL 74 - FER | Age: 61
LOS: 3 days | Discharge: HOME | DRG: 872 | End: 2021-08-01
Attending: NURSE PRACTITIONER | Admitting: INTERNAL MEDICINE
Payer: COMMERCIAL

## 2021-07-29 VITALS — BODY MASS INDEX: 31.5 KG/M2

## 2021-07-29 DIAGNOSIS — R50.9: ICD-10-CM

## 2021-07-29 DIAGNOSIS — R74.01: ICD-10-CM

## 2021-07-29 DIAGNOSIS — E83.51: ICD-10-CM

## 2021-07-29 DIAGNOSIS — N40.0: ICD-10-CM

## 2021-07-29 DIAGNOSIS — R94.5: ICD-10-CM

## 2021-07-29 DIAGNOSIS — I10: ICD-10-CM

## 2021-07-29 DIAGNOSIS — I25.10: ICD-10-CM

## 2021-07-29 DIAGNOSIS — E87.6: ICD-10-CM

## 2021-07-29 DIAGNOSIS — D72.819: ICD-10-CM

## 2021-07-29 DIAGNOSIS — I44.7: ICD-10-CM

## 2021-07-29 DIAGNOSIS — A60.00: ICD-10-CM

## 2021-07-29 DIAGNOSIS — D69.6: ICD-10-CM

## 2021-07-29 DIAGNOSIS — I42.0: ICD-10-CM

## 2021-07-29 DIAGNOSIS — E78.5: ICD-10-CM

## 2021-07-29 DIAGNOSIS — A41.89: Primary | ICD-10-CM

## 2021-07-29 DIAGNOSIS — G47.33: ICD-10-CM

## 2021-07-29 LAB
ALBUMIN SERPL-MCNC: 3.5 G/DL (ref 3.4–5)
ALP SERPL-CCNC: 47 U/L (ref 45–117)
ALT SERPL-CCNC: 84 U/L (ref 13–61)
ANION GAP SERPL CALC-SCNC: 11 MMOL/L (ref 8–16)
APTT BLD: 29.2 SECONDS (ref 25.2–36.5)
AST SERPL-CCNC: 117 U/L (ref 15–37)
BASOPHILS # BLD: 0.3 % (ref 0–2)
BILIRUB SERPL-MCNC: 1.2 MG/DL (ref 0.2–1)
BUN SERPL-MCNC: 10 MG/DL (ref 7–18)
CALCIUM SERPL-MCNC: 7.7 MG/DL (ref 8.5–10)
CHLORIDE SERPL-SCNC: 100 MMOL/L (ref 98–107)
CO2 SERPL-SCNC: 20 MMOL/L (ref 21–32)
CREAT SERPL-MCNC: 0.8 MG/DL (ref 0.55–1.3)
DEPRECATED RDW RBC AUTO: 12.4 % (ref 11.9–15.9)
DEPRECATED RDW RBC AUTO: 12.5 % (ref 11.9–15.9)
EOSINOPHIL # BLD: 0.1 % (ref 0–4.5)
GLUCOSE SERPL-MCNC: 128 MG/DL (ref 74–106)
HCT VFR BLD CALC: 36.6 % (ref 35.4–49)
HCT VFR BLD CALC: 38.5 % (ref 35.4–49)
HGB BLD-MCNC: 12.3 GM/DL (ref 11.7–16.9)
HGB BLD-MCNC: 13.1 GM/DL (ref 11.7–16.9)
INR BLD: 1.16 (ref 0.82–1.09)
LYMPHOCYTES # BLD: 3.6 % (ref 8–40)
MCH RBC QN AUTO: 32.8 PG (ref 25.7–33.7)
MCH RBC QN AUTO: 32.8 PG (ref 25.7–33.7)
MCHC RBC AUTO-ENTMCNC: 33.8 G/DL (ref 32–35.9)
MCHC RBC AUTO-ENTMCNC: 33.9 G/DL (ref 32–35.9)
MCV RBC: 96.8 FL (ref 80–96)
MCV RBC: 97.1 FL (ref 80–96)
MONOCYTES # BLD AUTO: 3 % (ref 3.8–10.2)
NEUTROPHILS # BLD: 93 % (ref 42.8–82.8)
PLATELET # BLD AUTO: 58 10^3/UL (ref 134–434)
PLATELET # BLD AUTO: 67 10^3/UL (ref 134–434)
PLATELET BLD QL SMEAR: (no result)
PMV BLD: 7.7 FL (ref 7.5–11.1)
PMV BLD: 7.8 FL (ref 7.5–11.1)
PROT SERPL-MCNC: 6 G/DL (ref 6.4–8.2)
PT PNL PPP: 12.9 SEC (ref 10.2–13)
RBC # BLD AUTO: 3.77 M/MM3 (ref 4–5.6)
RBC # BLD AUTO: 3.98 M/MM3 (ref 4–5.6)
SODIUM SERPL-SCNC: 131 MMOL/L (ref 136–145)
WBC # BLD AUTO: 2.1 K/MM3 (ref 4–10.8)
WBC # BLD AUTO: 3.2 K/MM3 (ref 4–10.8)

## 2021-07-29 PROCEDURE — C9803 HOPD COVID-19 SPEC COLLECT: HCPCS

## 2021-07-29 PROCEDURE — U0003 INFECTIOUS AGENT DETECTION BY NUCLEIC ACID (DNA OR RNA); SEVERE ACUTE RESPIRATORY SYNDROME CORONAVIRUS 2 (SARS-COV-2) (CORONAVIRUS DISEASE [COVID-19]), AMPLIFIED PROBE TECHNIQUE, MAKING USE OF HIGH THROUGHPUT TECHNOLOGIES AS DESCRIBED BY CMS-2020-01-R: HCPCS

## 2021-07-29 PROCEDURE — U0005 INFEC AGEN DETEC AMPLI PROBE: HCPCS

## 2021-07-29 RX ADMIN — SODIUM CHLORIDE, POTASSIUM CHLORIDE, SODIUM LACTATE AND CALCIUM CHLORIDE SCH MLS/HR: 600; 310; 30; 20 INJECTION, SOLUTION INTRAVENOUS at 18:50

## 2021-07-30 LAB
ALBUMIN SERPL-MCNC: 3.3 G/DL (ref 3.4–5)
ALP SERPL-CCNC: 65 U/L (ref 45–117)
ALT SERPL-CCNC: 127 U/L (ref 13–61)
ANION GAP SERPL CALC-SCNC: 11 MMOL/L (ref 8–16)
APTT BLD: 30.8 SECONDS (ref 25.2–36.5)
AST SERPL-CCNC: 185 U/L (ref 15–37)
BASOPHILS # BLD: 0.3 % (ref 0–2)
BILIRUB DIRECT SERPL-MCNC: 493 U/L (ref 84–246)
BILIRUB SERPL-MCNC: 1.3 MG/DL (ref 0.2–1)
BUN SERPL-MCNC: 9 MG/DL (ref 7–18)
CALCIUM SERPL-MCNC: 8 MG/DL (ref 8.5–10)
CHLORIDE SERPL-SCNC: 104 MMOL/L (ref 98–107)
CO2 SERPL-SCNC: 22 MMOL/L (ref 21–32)
CREAT SERPL-MCNC: 0.7 MG/DL (ref 0.55–1.3)
DEPRECATED RDW RBC AUTO: 12.6 % (ref 11.9–15.9)
EOSINOPHIL # BLD: 0 % (ref 0–4.5)
GLUCOSE SERPL-MCNC: 135 MG/DL (ref 74–106)
HCT VFR BLD CALC: 41.2 % (ref 35.4–49)
HGB BLD-MCNC: 13.4 GM/DL (ref 11.7–16.9)
INR BLD: 1.11 (ref 0.82–1.09)
LYMPHOCYTES # BLD: 13.7 % (ref 8–40)
MCH RBC QN AUTO: 32.2 PG (ref 25.7–33.7)
MCHC RBC AUTO-ENTMCNC: 32.6 G/DL (ref 32–35.9)
MCV RBC: 98.5 FL (ref 80–96)
MONOCYTES # BLD AUTO: 6.3 % (ref 3.8–10.2)
NEUTROPHILS # BLD: 79.7 % (ref 42.8–82.8)
PLATELET # BLD AUTO: 67 10^3/UL (ref 134–434)
PMV BLD: 9 FL (ref 7.5–11.1)
PROT SERPL-MCNC: 6 G/DL (ref 6.4–8.2)
PT PNL PPP: 12.3 SEC (ref 10.2–13)
RBC # BLD AUTO: 4.18 M/MM3 (ref 4–5.6)
SODIUM SERPL-SCNC: 137 MMOL/L (ref 136–145)
WBC # BLD AUTO: 2 K/MM3 (ref 4–10.8)

## 2021-07-30 RX ADMIN — DOXYCYCLINE SCH MLS/HR: 100 INJECTION, POWDER, LYOPHILIZED, FOR SOLUTION INTRAVENOUS at 16:58

## 2021-07-30 RX ADMIN — Medication SCH: at 22:00

## 2021-07-30 RX ADMIN — ROSUVASTATIN CALCIUM SCH MG: 20 TABLET, FILM COATED ORAL at 21:24

## 2021-07-30 RX ADMIN — SODIUM CHLORIDE, POTASSIUM CHLORIDE, SODIUM LACTATE AND CALCIUM CHLORIDE SCH MLS/HR: 600; 310; 30; 20 INJECTION, SOLUTION INTRAVENOUS at 21:24

## 2021-07-30 RX ADMIN — Medication SCH MG: at 01:32

## 2021-07-30 RX ADMIN — ACETAMINOPHEN PRN MG: 325 TABLET ORAL at 02:59

## 2021-07-30 RX ADMIN — CEFTRIAXONE SODIUM SCH MLS/HR: 2 INJECTION, POWDER, FOR SOLUTION INTRAMUSCULAR; INTRAVENOUS at 10:17

## 2021-07-30 RX ADMIN — DOXYCYCLINE SCH MLS/HR: 100 INJECTION, POWDER, LYOPHILIZED, FOR SOLUTION INTRAVENOUS at 05:09

## 2021-07-31 LAB
ALBUMIN SERPL-MCNC: 2.9 G/DL (ref 3.4–5)
ALP SERPL-CCNC: 63 U/L (ref 45–117)
ALT SERPL-CCNC: 122 U/L (ref 13–61)
ANION GAP SERPL CALC-SCNC: 9 MMOL/L (ref 8–16)
AST SERPL-CCNC: 149 U/L (ref 15–37)
BASOPHILS # BLD: 0 % (ref 0–2)
BILIRUB SERPL-MCNC: 1.1 MG/DL (ref 0.2–1)
BUN SERPL-MCNC: 10 MG/DL (ref 7–18)
CALCIUM SERPL-MCNC: 8 MG/DL (ref 8.5–10)
CHLORIDE SERPL-SCNC: 102 MMOL/L (ref 98–107)
CO2 SERPL-SCNC: 25 MMOL/L (ref 21–32)
CREAT SERPL-MCNC: 0.7 MG/DL (ref 0.55–1.3)
DEPRECATED RDW RBC AUTO: 12.7 % (ref 11.9–15.9)
EOSINOPHIL # BLD: 0.3 % (ref 0–4.5)
GLUCOSE SERPL-MCNC: 119 MG/DL (ref 74–106)
HCT VFR BLD CALC: 34.7 % (ref 35.4–49)
HGB BLD-MCNC: 11.7 GM/DL (ref 11.7–16.9)
LYMPHOCYTES # BLD: 18.7 % (ref 8–40)
MCH RBC QN AUTO: 32.9 PG (ref 25.7–33.7)
MCHC RBC AUTO-ENTMCNC: 33.8 G/DL (ref 32–35.9)
MCV RBC: 97.5 FL (ref 80–96)
MONOCYTES # BLD AUTO: 12.3 % (ref 3.8–10.2)
NEUTROPHILS # BLD: 68.7 % (ref 42.8–82.8)
PLATELET # BLD AUTO: 59 10^3/UL (ref 134–434)
PMV BLD: 9 FL (ref 7.5–11.1)
PROT SERPL-MCNC: 5.3 G/DL (ref 6.4–8.2)
RBC # BLD AUTO: 3.56 M/MM3 (ref 4–5.6)
SODIUM SERPL-SCNC: 136 MMOL/L (ref 136–145)
WBC # BLD AUTO: 3.9 K/MM3 (ref 4–10.8)

## 2021-07-31 RX ADMIN — ACETAMINOPHEN PRN MG: 325 TABLET ORAL at 21:31

## 2021-07-31 RX ADMIN — ASPIRIN SCH MG: 81 TABLET, COATED ORAL at 10:27

## 2021-07-31 RX ADMIN — CARVEDILOL SCH MG: 6.25 TABLET, FILM COATED ORAL at 21:30

## 2021-07-31 RX ADMIN — SODIUM CHLORIDE, POTASSIUM CHLORIDE, SODIUM LACTATE AND CALCIUM CHLORIDE SCH MLS/HR: 600; 310; 30; 20 INJECTION, SOLUTION INTRAVENOUS at 18:54

## 2021-07-31 RX ADMIN — CALCIUM SCH MG: 500 TABLET ORAL at 21:31

## 2021-07-31 RX ADMIN — DOXYCYCLINE SCH MLS/HR: 100 INJECTION, POWDER, LYOPHILIZED, FOR SOLUTION INTRAVENOUS at 04:47

## 2021-07-31 RX ADMIN — ROSUVASTATIN CALCIUM SCH MG: 20 TABLET, FILM COATED ORAL at 21:30

## 2021-07-31 RX ADMIN — VALACYCLOVIR HYDROCHLORIDE SCH MG: 500 TABLET ORAL at 10:26

## 2021-07-31 RX ADMIN — EZETIMIBE SCH MG: 10 TABLET ORAL at 10:26

## 2021-07-31 RX ADMIN — ACETAMINOPHEN PRN MG: 325 TABLET ORAL at 14:28

## 2021-07-31 RX ADMIN — DOXYCYCLINE SCH MLS/HR: 100 INJECTION, POWDER, LYOPHILIZED, FOR SOLUTION INTRAVENOUS at 17:47

## 2021-07-31 RX ADMIN — CEFTRIAXONE SODIUM SCH MLS/HR: 2 INJECTION, POWDER, FOR SOLUTION INTRAMUSCULAR; INTRAVENOUS at 10:25

## 2021-07-31 RX ADMIN — ACETAMINOPHEN PRN MG: 325 TABLET ORAL at 04:53

## 2021-07-31 RX ADMIN — Medication SCH MG: at 21:30

## 2021-08-01 VITALS — SYSTOLIC BLOOD PRESSURE: 134 MMHG | TEMPERATURE: 98.1 F | HEART RATE: 65 BPM | DIASTOLIC BLOOD PRESSURE: 65 MMHG

## 2021-08-01 LAB
ALBUMIN SERPL-MCNC: 2.9 G/DL (ref 3.4–5)
ALP SERPL-CCNC: 78 U/L (ref 45–117)
ALT SERPL-CCNC: 122 U/L (ref 13–61)
ANION GAP SERPL CALC-SCNC: 7 MMOL/L (ref 8–16)
ANISOCYTOSIS BLD QL: 0
AST SERPL-CCNC: 105 U/L (ref 15–37)
BASOPHILS # BLD: 0.2 % (ref 0–2)
BILIRUB SERPL-MCNC: 0.7 MG/DL (ref 0.2–1)
BUN SERPL-MCNC: 11.2 MG/DL (ref 7–18)
CALCIUM SERPL-MCNC: 8 MG/DL (ref 8.5–10.1)
CHLORIDE SERPL-SCNC: 108 MMOL/L (ref 98–107)
CO2 SERPL-SCNC: 28 MMOL/L (ref 21–32)
CREAT SERPL-MCNC: 0.7 MG/DL (ref 0.55–1.3)
DEPRECATED RDW RBC AUTO: 13.5 % (ref 11.9–15.9)
EOSINOPHIL # BLD: 1 % (ref 0–4.5)
GLUCOSE SERPL-MCNC: 92 MG/DL (ref 74–106)
HCT VFR BLD CALC: 32.1 % (ref 35.4–49)
HGB BLD-MCNC: 11.3 GM/DL (ref 11.7–16.9)
LYMPHOCYTES # BLD: 32.5 % (ref 8–40)
MACROCYTES BLD QL: 0
MCH RBC QN AUTO: 33.4 PG (ref 25.7–33.7)
MCHC RBC AUTO-ENTMCNC: 35.1 G/DL (ref 32–35.9)
MCV RBC: 95.3 FL (ref 80–96)
MONOCYTES # BLD AUTO: 11.5 % (ref 3.8–10.2)
NEUTROPHILS # BLD: 54.8 % (ref 42.8–82.8)
PLATELET # BLD AUTO: 79 10^3/UL (ref 134–434)
PLATELET BLD QL SMEAR: (no result)
PMV BLD: 8.5 FL (ref 7.5–11.1)
PROT SERPL-MCNC: 5.4 G/DL (ref 6.4–8.2)
RBC # BLD AUTO: 3.37 M/MM3 (ref 4–5.6)
SODIUM SERPL-SCNC: 143 MMOL/L (ref 136–145)
WBC # BLD AUTO: 4.5 K/MM3 (ref 4–10)

## 2021-08-01 RX ADMIN — ACETAMINOPHEN PRN MG: 325 TABLET ORAL at 06:26

## 2021-08-01 RX ADMIN — CEFTRIAXONE SODIUM SCH MLS/HR: 2 INJECTION, POWDER, FOR SOLUTION INTRAMUSCULAR; INTRAVENOUS at 09:34

## 2021-08-01 RX ADMIN — DOXYCYCLINE SCH MLS/HR: 100 INJECTION, POWDER, LYOPHILIZED, FOR SOLUTION INTRAVENOUS at 06:26

## 2021-08-01 RX ADMIN — EZETIMIBE SCH MG: 10 TABLET ORAL at 09:32

## 2021-08-01 RX ADMIN — ASPIRIN SCH MG: 81 TABLET, COATED ORAL at 09:33

## 2021-08-01 RX ADMIN — CALCIUM SCH MG: 500 TABLET ORAL at 09:34

## 2021-08-01 RX ADMIN — CARVEDILOL SCH MG: 6.25 TABLET, FILM COATED ORAL at 09:32

## 2021-08-01 RX ADMIN — VALACYCLOVIR HYDROCHLORIDE SCH: 500 TABLET ORAL at 09:33

## 2021-08-30 ENCOUNTER — APPOINTMENT (OUTPATIENT)
Dept: CARDIOLOGY | Facility: CLINIC | Age: 61
End: 2021-08-30
Payer: COMMERCIAL

## 2021-08-30 VITALS
BODY MASS INDEX: 30.7 KG/M2 | OXYGEN SATURATION: 97 % | HEART RATE: 61 BPM | DIASTOLIC BLOOD PRESSURE: 60 MMHG | WEIGHT: 217 LBS | SYSTOLIC BLOOD PRESSURE: 112 MMHG

## 2021-08-30 DIAGNOSIS — Z86.19 PERSONAL HISTORY OF OTHER INFECTIOUS AND PARASITIC DISEASES: ICD-10-CM

## 2021-08-30 PROCEDURE — 99214 OFFICE O/P EST MOD 30 MIN: CPT

## 2021-09-01 PROBLEM — Z86.19 HISTORY OF SEPSIS: Status: RESOLVED | Noted: 2021-09-01 | Resolved: 2021-09-01

## 2021-09-01 NOTE — PHYSICAL EXAM
[Normal Conjunctiva] : the conjunctiva exhibited no abnormalities [Auscultation Breath Sounds / Voice Sounds] : lungs were clear to auscultation bilaterally [Heart Rate And Rhythm] : heart rate and rhythm were normal [Heart Sounds] : normal S1 and S2 [Murmurs] : no murmurs present [Arterial Pulses Normal] : the arterial pulses were normal [Edema] : no peripheral edema present [Bowel Sounds] : normal bowel sounds [Abdomen Soft] : soft [Abnormal Walk] : normal gait [Cyanosis, Localized] : no localized cyanosis [] : no rash [Affect] : the affect was normal [FreeTextEntry1] : feet warm, well. dorsalis pedis pulses +2 bilaterally

## 2021-09-01 NOTE — DISCUSSION/SUMMARY
[FreeTextEntry1] : Cardiomyopathy\par The working diagnosis is a nonischemic dilated cardiomyopathy. The degree of coronary disease  (10/09 coronary arteriogram 50% LAD patent circumflex and RCA normal perfusion 1/20 Lexiscan sesta mibi study )  is inadequate to explain the degree of left ventricular systolic dysfunction. Left ventricular systolic function is depressed as reflected by a left ventricular ejection fraction of  28 % on the 1/20  gated sestamibi study and 41% on the  1/21  echocardiogram. There is no clinical congestive heart failure. Beta blocker and ACE-I /ARB  therapy are attractive. Recent data have demonstrated the benefit of Neprilysin inhibition in patients with significant left ventricular systolic dysfunction .\par Relatively low blood pressure levels may make higher doses of Entresto problematic. \par The presence of a left bundle branch block with a wide QRS complex (160 ms) would indicate a favorable response to resynchronization therapy/biventricular pacemaker implantation .\par \par I have recommended the following:\par 1 Continue the  present medical regimen at this time\par 2. Consideration for increase Entresto  dose to 97/103 bid  dependent on the patient's hemodynamics and clinical course\par 3. Consideration for biventricular pacemaker in the future depending on the patient's clinical course, response to the above measures.\par 4. No further cardiac testing for this problem at this time\par \par \par \par Atherosclerotic heart disease\par Atherosclerotic heart disease is stable. A 10/09 coronary arteriogram demonstrated a 50% LAD stenosis. The left circumflex and RCA were patent. The left ventriculogram demonstrated an ejection fraction of 57%.  A1/20 Lexiscan sestamibi study demonstrated normal perfusion with no ischemia or infarct.  Left ventricular systolic function  is depressed  as reflected by a left ventricle ejection fraction of 28 % on the 1/20   gated sestamibi study and 41 % on the 1/21 echocardiogram.. In view of the lack of angina, above noninvasive studies and clinical course continued medical management is indicated at this time.\par \par I have recommended the following:\par 1 risk factor modification\par 2 continue the present medical regimen\par 3 no further cardiac testing for this problem at this time\par \par \par \par \par \par \par Left bundle branch block\par Mr. Meyer has a known chronic left bundle branch block. The presence of a left bundle branch block is common in the setting of a cardiomyopathy. As previously noted a left bundle branch block with wide QRS duration ( 160 ms) would indicate a high likelihood of a favorable response to resynchronization therapy/biventricular pacemaker implantation.\par \par I have recommended the following\par 1. Consideration for biventricular pacemaker implantation dependent on the patient's clinical course as discussed above.\par \par \par \par \par Hypertension\par Hypertension is controlled on the present medical regimen. In the setting of left ventricular systolic dysfunction beta blocker and ACE-I./ARB and Neprilysin inhibition    are attractive. .\par \par I have recommended the following:\par 1 low salt low fat low cholesterol diet. Regular aerobic exercise\par 2 continue the present  medical regimen.\par \par \par \par \par Hyperlipidemia\par Hyperlipidemia represents a risk factor for progressive atherosclerotic disease. The target LDL level with known atherosclerotic heart disease is about 70. HMG CoA reductase  inhibitor therapy has been effective. In 12/19  the  serum cholesterol level was 204 HDL 52  LDL 93  and triglycerides 293.  More recent lipid levels are not available for review  . Non pharmacological  therapy, specifically diet exercise and weight loss are emphasized as major aspects of treatment.\par \par I have recommended the following:\par 1 low salt low fat low cholesterol diet. Regular aerobic exercise\par 2 continue the present medical regimen\par 3 routine laboratory studies including lipid profile through primary care Dr. Nash\par \par \par \par Obesity\par Obesity exacerbates Adarsh's cardiovascular issues.. Today Mr. Meyer is 5 feet 9 inches tall and weighs 217 lbs.  He has lost 12 pounds in the last 6 months. Continued  diet exercise and weight loss are advised.\par \par \par \par The diagnosis, prognosis, risks options and alternatives were explained at length to the patient. All questions were answered. Issues discussed included cardio myopathy/left ventricular systolic dysfunction, heart failure, hyperlipidemia  hypertension  noninvasive cardiac testing diet and exercise.\par \par \par Counseling and coordination of care\par Time was a significant factor for this patient encounter. Total time spent with the patient was  30   minutes. 50% of the time was devoted to counseling and coordination of care.

## 2021-09-01 NOTE — REVIEW OF SYSTEMS
[Feeling Fatigued] : feeling fatigued [Joint Pain] : joint pain [Negative] : Psychiatric [FreeTextEntry5] : see history of present illness [FreeTextEntry9] : Right ankle pain/swelling being evaluated by orthopedic surgery Dr. Correia

## 2021-09-01 NOTE — HISTORY OF PRESENT ILLNESS
[FreeTextEntry1] : 60-year-old man\par Routine followup hospital\par \par Mr. Meyer was hospitalized at [facility] in 7/21 because of fevers and chills. He was diagnosed as having " sepsis"  However no specific organism was identified. He was treated with antibiotics with clinical and symptomatic improvement.  ( see hospital records.)\par \par \par Presently Adarsh states that he "feels great." Mr. Meyer denies chest pain, shortness of breath orthopnea palpitations or syncope.

## 2021-09-20 ENCOUNTER — APPOINTMENT (OUTPATIENT)
Dept: CARDIOLOGY | Facility: CLINIC | Age: 61
End: 2021-09-20

## 2021-12-20 ENCOUNTER — RX RENEWAL (OUTPATIENT)
Age: 61
End: 2021-12-20

## 2022-02-01 ENCOUNTER — APPOINTMENT (OUTPATIENT)
Dept: CARDIOLOGY | Facility: CLINIC | Age: 62
End: 2022-02-01
Payer: COMMERCIAL

## 2022-02-01 ENCOUNTER — NON-APPOINTMENT (OUTPATIENT)
Age: 62
End: 2022-02-01

## 2022-02-01 VITALS
DIASTOLIC BLOOD PRESSURE: 68 MMHG | SYSTOLIC BLOOD PRESSURE: 118 MMHG | WEIGHT: 232 LBS | HEART RATE: 67 BPM | BODY MASS INDEX: 32.82 KG/M2 | OXYGEN SATURATION: 96 %

## 2022-02-01 DIAGNOSIS — R79.89 OTHER SPECIFIED ABNORMAL FINDINGS OF BLOOD CHEMISTRY: ICD-10-CM

## 2022-02-01 PROCEDURE — 99214 OFFICE O/P EST MOD 30 MIN: CPT

## 2022-02-01 PROCEDURE — 93000 ELECTROCARDIOGRAM COMPLETE: CPT

## 2022-02-02 PROBLEM — R79.89 LOW TESTOSTERONE: Status: RESOLVED | Noted: 2022-02-02 | Resolved: 2022-02-02

## 2022-02-02 RX ORDER — TESTOSTERONE ENANTHATE 100 MG/.5ML
100 INJECTION SUBCUTANEOUS
Refills: 0 | Status: ACTIVE | COMMUNITY

## 2022-02-02 NOTE — DISCUSSION/SUMMARY
[FreeTextEntry1] : Cardiomyopathy\par The working diagnosis is a nonischemic dilated cardiomyopathy. The degree of coronary disease  (10/09 coronary arteriogram 50% LAD patent circumflex and RCA normal perfusion 1/20 Lexiscan sesta mibi study )  is inadequate to explain the degree of left ventricular systolic dysfunction. Left ventricular systolic function is depressed as reflected by a left ventricular ejection fraction of  28 % on the 1/20  gated sestamibi study and 41% on the  1/21  echocardiogram. There is no clinical congestive heart failure. Beta blocker and ACE-I /ARB  therapy are attractive. Recent data have demonstrated the benefit of Neprilysin inhibition in patients with significant left ventricular systolic dysfunction .\par Relatively low blood pressure levels may make higher doses of Entresto problematic. \par The presence of a left bundle branch block with a wide QRS complex (160 ms) would indicate a favorable response to resynchronization therapy/biventricular pacemaker implantation .\par \par I have recommended the following:\par 1 Continue the  present medical regimen at this time\par 2. Consideration for increase Entresto  dose to 97/103 bid  dependent on the patient's hemodynamics and clinical course\par 3. Consideration for biventricular pacemaker in the future depending on the patient's clinical course, response to the above measures.\par 4.Echocardiogram with next office evaluation in 6 months\par \par \par \par Atherosclerotic heart disease\par Atherosclerotic heart disease is stable. A 10/09 coronary arteriogram demonstrated a 50% LAD stenosis. The left circumflex and RCA were patent. The left ventriculogram demonstrated an ejection fraction of 57%.  A1/20 Lexiscan sestamibi study demonstrated normal perfusion with no ischemia or infarct.  Left ventricular systolic function  is depressed  as reflected by a left ventricle ejection fraction of 28 % on the 1/20   gated sestamibi study and 41 % on the 1/21 echocardiogram.. In view of the lack of angina, above noninvasive studies and clinical course continued medical management is indicated at this time.\par \par I have recommended the following:\par 1 risk factor modification\par 2 continue the present medical regimen\par 3 no further cardiac testing for this problem at this time\par 4.echocardiogram with  next office evaluation in 6 months\par \par \par \par \par \par \par Left bundle branch block\par Mr. Meyer has a known chronic left bundle branch block. The presence of a left bundle branch block is common in the setting of a cardiomyopathy. As previously noted a left bundle branch block with wide QRS duration ( 160 ms) would indicate a high likelihood of a favorable response to resynchronization therapy/biventricular pacemaker implantation.\par \par I have recommended the following\par 1. Consideration for biventricular pacemaker implantation dependent on the patient's clinical course as discussed above.\par \par \par \par \par Hypertension\par Hypertension is controlled on the present medical regimen. In the setting of left ventricular systolic dysfunction beta blocker , ACE-I./ARB and Neprilysin inhibition    are attractive. .\par \par I have recommended the following:\par 1 low salt low fat low cholesterol diet. Regular aerobic exercise\par 2 continue the present  medical regimen.\par \par \par \par \par Hyperlipidemia\par Hyperlipidemia represents a risk factor for progressive atherosclerotic disease. The target LDL level with known atherosclerotic heart disease is about 70. HMG CoA reductase  inhibitor therapy has been effective. In 1/22   the  serum cholesterol level was 155  HDL 55   LDL 67   and triglycerides 163.  More recent lipid levels are not available for review  . Non pharmacological  therapy, specifically diet exercise and weight loss are emphasized as major aspects of treatment.\par \par I have recommended the following:\par 1 low salt low fat low cholesterol diet. Regular aerobic exercise\par 2 continue the present medical regimen\par 3 routine laboratory studies including lipid profile through primary care Dr. Oviedo\par \par \par \par Obesity\par Obesity exacerbates Adarsh's cardiovascular issues.. Today Mr. Meyer is 5 feet 9 inches tall and weighs 232 lbs.  He has gained  15  pounds in the last 6 months. Diet exercise and weight loss are advised.\par \par \par \par The diagnosis, prognosis, risks options and alternatives were explained at length to the patient. All questions were answered. Issues discussed included cardio myopathy/left ventricular systolic dysfunction, heart failure, hyperlipidemia  hypertension  noninvasive cardiac testing diet and exercise.\par \par \par Counseling and coordination of care\par Time was a significant factor for this patient encounter. Total time spent with the patient was  30   minutes. 50% of the time was devoted to counseling and/or  coordination of care.

## 2022-02-02 NOTE — REVIEW OF SYSTEMS
[Feeling Fatigued] : feeling fatigued [Joint Pain] : joint pain [Negative] : Heme/Lymph [FreeTextEntry3] : reading glasses [FreeTextEntry5] : see history of present illness

## 2022-02-02 NOTE — HISTORY OF PRESENT ILLNESS
[FreeTextEntry1] : 61-year-old man\par Routine followup\par "I feel terrific"  Adarsh denies chest pain, palpitations, shortness of breath or syncope. He is been eating poorly and gaining weight. He has gained 15 pounds since his last visit here in 8/21

## 2022-04-26 ENCOUNTER — RESULT REVIEW (OUTPATIENT)
Age: 62
End: 2022-04-26

## 2022-04-26 ENCOUNTER — APPOINTMENT (OUTPATIENT)
Dept: CARDIOLOGY | Facility: CLINIC | Age: 62
End: 2022-04-26
Payer: COMMERCIAL

## 2022-04-26 VITALS
HEIGHT: 70 IN | WEIGHT: 229 LBS | HEART RATE: 62 BPM | SYSTOLIC BLOOD PRESSURE: 114 MMHG | DIASTOLIC BLOOD PRESSURE: 70 MMHG | BODY MASS INDEX: 32.78 KG/M2 | OXYGEN SATURATION: 97 %

## 2022-04-26 PROCEDURE — 99214 OFFICE O/P EST MOD 30 MIN: CPT

## 2022-04-26 PROCEDURE — 93000 ELECTROCARDIOGRAM COMPLETE: CPT

## 2022-04-27 ENCOUNTER — APPOINTMENT (OUTPATIENT)
Dept: CARDIOLOGY | Facility: CLINIC | Age: 62
End: 2022-04-27
Payer: COMMERCIAL

## 2022-04-27 PROCEDURE — 93306 TTE W/DOPPLER COMPLETE: CPT

## 2022-04-29 NOTE — DISCUSSION/SUMMARY
[FreeTextEntry1] : \par \par \par \par \par \par \par Chest pain/dyspnea\par The working diagnosis is chest pain and dyspnea secondary to musculoskeletal pain and deconditioning. The history is consistent with this diagnosis. The differential diagnosis would include myocardial ischemia secondary to atherosclerotic heart disease. The patient has known coronary disease reflected by a 10/09 coronary arteriogram  showing a 50% LAD stenosis. The presence of a left bundle branch block precludes ECG assessment regarding acute myocardial ischemia or infarction. The present cardiac physical examination is consistent with euvolemic state and the absence of pulmonary venous congestion. Noninvasive cardiac studies would be helpful for management decisions.\par \par I have recommended the following\par a. Chest x-ray PA and lateral\par b. Echocardiogram\par c. Lexiscan sestamibi studyCardiomyopathy\par The working diagnosis is a nonischemic dilated cardiomyopathy. The degree of coronary disease  (10/09 coronary arteriogram 50% LAD patent circumflex and RCA normal perfusion 1/20 Lexiscan sesta mibi study )  is inadequate to explain the degree of left ventricular systolic dysfunction. Left ventricular systolic function is depressed as reflected by a left ventricular ejection fraction of  28 % on the 1/20  gated sestamibi study and 41% on the  1/21  echocardiogram. There is no clinical congestive heart failure. Beta blocker and ACE-I /ARB  therapy are attractive. Recent data have demonstrated the benefit of Neprilysin inhibition in patients with significant left ventricular systolic dysfunction .\par Relatively low blood pressure levels may make higher doses of Entresto problematic. \par The presence of a left bundle branch block with a wide QRS complex (150 ms) would indicate a favorable response to resynchronization therapy/biventricular pacemaker implantation .\par In view of the present symptoms a noninvasive  cardiac reevaluation is indicated.\par \par I have recommended the following:\par 1 Continue the  present medical regimen at this time\par 2. echocardiogram\par 3. chest x-ray PA and lateral\par 4. Lexiscan sestamibi study\par 5. Consideration for increase Entresto  dose to 97/103 bid  dependent on the patient's hemodynamics and clinical course\par 6. Consideration for biventricular pacemaker in the future depending on the patient's clinical course, response to the above measures.\par \par \par \par \par Atherosclerotic heart disease\par Mr. Meyer has known atherosclerotic heart disease. A 10/09 coronary arteriogram demonstrated a 50% LAD stenosis. The left circumflex and RCA were patent. The left ventriculogram demonstrated an ejection fraction of 57%.  A1/20 Lexiscan sestamibi study demonstrated normal perfusion with no ischemia or infarct.  Left ventricular systolic function  is depressed  as reflected by a left ventricle ejection fraction of 28 % on the 1/20   gated sestamibi study and 41 % on the 1/21 echocardiogram.In light of the present symptoms a noninvasive cardiac reevaluation would be helpful for management decisions.\par \par I have recommended the following:\par 1 risk factor modification\par 2 continue the present medical regimen\par 3 echocardiogram \par 4. Lexiscan sesta mibi study\par \par \par \par \par \par \par Left bundle branch block\par Mr. Meyer has a known chronic left bundle branch block. The presence of a left bundle branch block is common in the setting of a cardiomyopathy. As previously noted a left bundle branch block with wide QRS duration ( 150 ms) would indicate a high likelihood of a favorable response to resynchronization therapy/biventricular pacemaker implantation.\par \par I have recommended the following\par 1. Consideration for biventricular pacemaker implantation dependent on the patient's clinical course as discussed above.\par \par \par \par \par Hypertension\par Hypertension is controlled on the present medical regimen. In the setting of left ventricular systolic dysfunction beta blocker , ACE-I./ARB and Neprilysin inhibition    are attractive. .\par \par I have recommended the following:\par 1 low salt low fat low cholesterol diet. Regular aerobic exercise\par 2 continue the present  medical regimen.\par \par \par \par \par Hyperlipidemia\par Hyperlipidemia represents a risk factor for progressive atherosclerotic disease. The target LDL level with known atherosclerotic heart disease is about 70. HMG CoA reductase  inhibitor therapy has been effective. In 1/22   the  serum cholesterol level was 155  HDL 55   LDL 67   and triglycerides 163. . Non pharmacological  therapy, specifically diet exercise and weight loss are emphasized as major aspects of treatment.\par \par I have recommended the following:\par 1 low salt low fat low cholesterol diet. Regular aerobic exercise\par 2 continue the present medical regimen\par 3 routine laboratory studies including lipid profile through primary care Dr. Oviedo\par \par \par \par Obesity\par Obesity exacerbates Adarsh's cardiovascular issues.. Today Mr. Meyer is 5 feet 9 inches tall and weighs 229  lbs. . Diet exercise and weight loss are advised.\par \par \par \par The diagnosis, prognosis, risks options and alternatives were explained at length to the patient. All questions were answered. Issues discussed included cardio myopathy/left ventricular systolic dysfunction, heart failure, hyperlipidemia  hypertension  noninvasive cardiac testing diet and exercise.\par \par \par Counseling and coordination of care\par Time was a significant factor for this patient encounter. Total time spent with the patient was  30   minutes. 50% of the time was devoted to counseling and/or  coordination of care.

## 2022-04-29 NOTE — HISTORY OF PRESENT ILLNESS
[FreeTextEntry1] : 61-year-old man\par Unscheduled visit\par Patient called with dyspnea at rest and chest discomfort. No diaphoresis. No nausea. No exertional symptoms.

## 2022-05-02 ENCOUNTER — NON-APPOINTMENT (OUTPATIENT)
Age: 62
End: 2022-05-02

## 2022-05-09 ENCOUNTER — RESULT REVIEW (OUTPATIENT)
Age: 62
End: 2022-05-09

## 2022-06-06 ENCOUNTER — APPOINTMENT (OUTPATIENT)
Dept: CARDIOLOGY | Facility: CLINIC | Age: 62
End: 2022-06-06
Payer: COMMERCIAL

## 2022-06-06 VITALS
BODY MASS INDEX: 33.58 KG/M2 | DIASTOLIC BLOOD PRESSURE: 70 MMHG | HEART RATE: 65 BPM | SYSTOLIC BLOOD PRESSURE: 120 MMHG | OXYGEN SATURATION: 98 % | WEIGHT: 234 LBS

## 2022-06-06 PROCEDURE — 99214 OFFICE O/P EST MOD 30 MIN: CPT

## 2022-06-07 NOTE — HISTORY OF PRESENT ILLNESS
[FreeTextEntry1] : 61-year-old man\par Routine followup\par "I feel okay." AJ denies chest pain, shortness of breath, palpitations or syncope. No ankle edema. No orthopnea

## 2022-06-07 NOTE — DISCUSSION/SUMMARY
[FreeTextEntry1] : Cardiomyopathy\par The working diagnosis is a nonischemic dilated cardiomyopathy. The degree of coronary disease  (10/09 coronary arteriogram 50% LAD patent circumflex and RCA , normal perfusion  5/22 Lexiscan sesta mibi study )  is inadequate to explain the degree of left ventricular systolic dysfunction. Left ventricular systolic function is depressed as reflected by a left ventricular ejection fraction of  37%  on the 5/22  gated sestamibi study and 46 % on the  4/22   echocardiogram. There is no clinical congestive heart failure. Beta blocker and ACE-I /ARB  therapy are attractive. Recent data have demonstrated the benefit of Neprilysin inhibition in patients with significant left ventricular systolic dysfunction .\par Relatively low blood pressure levels may make higher doses of Entresto problematic. \par The presence of a left bundle branch block with a wide QRS complex (150 ms) would indicate a favorable response to resynchronization therapy/biventricular pacemaker implantation .In light of the lack of symptoms and above noninvasive studies continuing the present medical therapy is indicated \par \par \par I have recommended the following:\par 1 Continue the  present medical regimen at this time\par 2. . Consideration for increase Entresto  dose to 97/103 bid  dependent on the patient's hemodynamics and clinical course\par 3 . Consideration for biventricular pacemaker in the future depending on the patient's clinical course, \par 4. No further cardiac testing for this problem at this time\par \par \par \par \par Atherosclerotic heart disease\par Mr. Meyer has known atherosclerotic heart disease. A 10/09 coronary arteriogram demonstrated a 50% LAD stenosis. The left circumflex and RCA were patent. The left ventriculogram demonstrated an ejection fraction of 57%.  A  5/22  Lexiscan sestamibi study demonstrated normal perfusion with no ischemia ..  Left ventricular systolic function  is depressed  as reflected by a left ventricle ejection fraction of 37 % on the 1/20   gated sestamibi study and 46  % on the  4/22  echocardiogram.\par In light of the lack of angina and above noninvasive studies continued medical management is indicated at this time..\par \par I have recommended the following:\par 1 risk factor modification\par 2 continue the present medical regimen\par 3 No further cardiac testing for this problem at this time\par \par \par \par \par \par \par Left bundle branch block\par Mr. Meyer has a known chronic left bundle branch block. The presence of a left bundle branch block is common in the setting of a cardiomyopathy. As previously noted a left bundle branch block with wide QRS duration ( 150 ms) would indicate a high likelihood of a favorable response to resynchronization therapy/biventricular pacemaker implantation.\par \par I have recommended the following\par 1. Consideration for biventricular pacemaker implantation dependent on the patient's clinical course as discussed above.\par \par \par \par \par Hypertension\par Hypertension is controlled on the present medical regimen. In the setting of left ventricular systolic dysfunction beta blocker , ACE-I./ARB and Neprilysin inhibition    are attractive. .\par \par I have recommended the following:\par 1 low salt low fat low cholesterol diet. Regular aerobic exercise\par 2 continue the present  medical regimen.\par \par \par \par \par Hyperlipidemia\par Hyperlipidemia represents a risk factor for progressive atherosclerotic disease. The target LDL level with known atherosclerotic heart disease is about 70. HMG CoA reductase  inhibitor therapy has been effective. In 1/22   the  serum cholesterol level was 155  HDL 55   LDL 67   and triglycerides 163. . Non pharmacological  therapy, specifically diet exercise and weight loss are emphasized as major aspects of treatment.\par \par I have recommended the following:\par 1 low salt low fat low cholesterol diet. Regular aerobic exercise\par 2 continue the present medical regimen\par 3 routine laboratory studies including lipid profile through primary care Dr. Oviedo\par \par \par \par Obesity\par Obesity exacerbates Adarsh's cardiovascular issues.. Today Mr. Meyer is 5 feet 9 inches tall and weighs 234  lbs. He  has  gained 5 pounds in the last 6 weeks.. Diet exercise and weight loss are advised.\par \par \par \par The diagnosis, prognosis, risks options and alternatives were explained at length to the patient. All questions were answered. Issues discussed included cardio myopathy/left ventricular systolic dysfunction, heart failure, hyperlipidemia  hypertension  noninvasive cardiac testing diet and exercise.\par \par \par Counseling and coordination of care\par Time was a significant factor for this patient encounter. Total time spent with the patient was  30   minutes. 50% of the time was devoted to counseling and/or  coordination of care.

## 2022-08-08 ENCOUNTER — APPOINTMENT (OUTPATIENT)
Dept: CARDIOLOGY | Facility: CLINIC | Age: 62
End: 2022-08-08

## 2022-08-15 ENCOUNTER — APPOINTMENT (OUTPATIENT)
Dept: CARDIOLOGY | Facility: CLINIC | Age: 62
End: 2022-08-15

## 2022-08-22 ENCOUNTER — HOSPITAL ENCOUNTER (OUTPATIENT)
Dept: HOSPITAL 74 - FASU-ENDO | Age: 62
Discharge: HOME | End: 2022-08-22
Attending: INTERNAL MEDICINE
Payer: COMMERCIAL

## 2022-08-22 VITALS — DIASTOLIC BLOOD PRESSURE: 61 MMHG | SYSTOLIC BLOOD PRESSURE: 111 MMHG | HEART RATE: 52 BPM

## 2022-08-22 VITALS — RESPIRATION RATE: 20 BRPM

## 2022-08-22 VITALS — TEMPERATURE: 97.8 F

## 2022-08-22 VITALS — BODY MASS INDEX: 30.8 KG/M2

## 2022-08-22 DIAGNOSIS — K57.30: ICD-10-CM

## 2022-08-22 DIAGNOSIS — Z12.11: Primary | ICD-10-CM

## 2022-08-22 DIAGNOSIS — Z86.010: ICD-10-CM

## 2022-08-22 PROCEDURE — 0DJD8ZZ INSPECTION OF LOWER INTESTINAL TRACT, VIA NATURAL OR ARTIFICIAL OPENING ENDOSCOPIC: ICD-10-PCS | Performed by: INTERNAL MEDICINE

## 2023-02-01 ENCOUNTER — APPOINTMENT (OUTPATIENT)
Dept: CARDIOLOGY | Facility: CLINIC | Age: 63
End: 2023-02-01
Payer: COMMERCIAL

## 2023-02-01 ENCOUNTER — RX RENEWAL (OUTPATIENT)
Age: 63
End: 2023-02-01

## 2023-02-01 VITALS
DIASTOLIC BLOOD PRESSURE: 86 MMHG | WEIGHT: 222 LBS | SYSTOLIC BLOOD PRESSURE: 150 MMHG | BODY MASS INDEX: 31.78 KG/M2 | HEIGHT: 70 IN | OXYGEN SATURATION: 99 % | HEART RATE: 60 BPM

## 2023-02-01 PROCEDURE — 99214 OFFICE O/P EST MOD 30 MIN: CPT

## 2023-02-04 NOTE — PHYSICAL EXAM
[Normal] : normal conjunctiva [Normal S1, S2] : normal S1, S2 [Clear Lung Fields] : clear lung fields [Non Tender] : non-tender [Normal Bowel Sounds] : normal bowel sounds [Normal Gait] : normal gait [No Rash] : no rash [No Focal Deficits] : no focal deficits [Normal Conjunctiva] : the conjunctiva exhibited no abnormalities [Auscultation Breath Sounds / Voice Sounds] : lungs were clear to auscultation bilaterally [Heart Rate And Rhythm] : heart rate and rhythm were normal [Heart Sounds] : normal S1 and S2 [Murmurs] : no murmurs present [Arterial Pulses Normal] : the arterial pulses were normal [Edema] : no peripheral edema present [Bowel Sounds] : normal bowel sounds [Abdomen Soft] : soft [Abnormal Walk] : normal gait [Cyanosis, Localized] : no localized cyanosis [] : no rash [Affect] : the affect was normal [de-identified] : appears somewhat overweight lying flat in no distress [de-identified] : no  neck vein distention.  no carotid  buit [de-identified] : full range of   motion [de-identified] : no murmur.  no gallop [de-identified] : dorsalis pedis pulses +2 bilaterally. no edema [de-identified] : pleasant [FreeTextEntry1] : pleasant

## 2023-02-04 NOTE — HISTORY OF PRESENT ILLNESS
[FreeTextEntry1] : 62-year-old man\par Routine follow-up\par \par AJ denies chest pain shortness of breath, palpitations or syncope.  He is physically active without restriction or limitation.  Mr. Meyer continues to struggle with his weight.\par \par Mr. Meyer has requested a referral for a primary care physician.  I have recommended Dr. DEBBIE Briones.

## 2023-02-04 NOTE — DISCUSSION/SUMMARY
[FreeTextEntry1] : Cardiomyopathy\par The working diagnosis is a nonischemic dilated cardiomyopathy. The degree of coronary disease  (10/09 coronary arteriogram 50% LAD patent circumflex and RCA , normal perfusion  5/22 Lexiscan sesta mibi study )  is inadequate to explain the degree of left ventricular systolic dysfunction. Left ventricular systolic function is depressed as reflected by a left ventricular ejection fraction of  37%  on the 5/22  gated sestamibi study and 46 % on the  4/22   echocardiogram. There is no clinical congestive heart failure. Beta blocker and ACE-I /ARB  therapy are attractive. Recent data have demonstrated the benefit of Neprilysin inhibition in patients with significant left ventricular systolic dysfunction .\par Relatively low blood pressure levels may make higher doses of Entresto problematic. \par The presence of a left bundle branch block with a wide QRS complex (150 ms) would indicate a favorable response to resynchronization therapy/biventricular pacemaker implantation .In light of the lack of symptoms and above noninvasive studies continuing the present medical therapy is indicated \par \par \par I have recommended the following:\par 1 Continue the  present medical regimen at this time\par 2. . Consideration for increase Entresto  dose to 97/103 bid  dependent on the patient's hemodynamics and clinical course\par 3 . Consideration for biventricular pacemaker in the future depending on the patient's clinical course, \par 4. No further cardiac testing for this problem at this time\par 5.  Echocardiogram with next office evaluation in 6 months\par \par \par \par \par Atherosclerotic heart disease\par Mr. Meyer has known atherosclerotic heart disease. A 10/09 coronary arteriogram demonstrated a 50% LAD stenosis. The left circumflex and RCA were patent. The left ventriculogram demonstrated an ejection fraction of 57%.  A  5/22  Lexiscan sestamibi study demonstrated normal perfusion with no ischemia ..  Left ventricular systolic function  is depressed  as reflected by a left ventricle ejection fraction of 37 % on the 1/20   gated sestamibi study and 46  % on the  4/22  echocardiogram.\par In light of the lack of angina and above noninvasive studies continued medical management is indicated at this time..\par \par I have recommended the following:\par 1 risk factor modification\par 2 continue the present medical regimen\par 3 No further cardiac testing for this problem at this time\par 4.  Echocardiogram with next office evaluation in 6 months\par \par \par \par \par \par \par Left bundle branch block\par Mr. Meyer has a known chronic left bundle branch block. The presence of a left bundle branch block is common in the setting of a cardiomyopathy. As previously noted a left bundle branch block with wide QRS duration ( 150 ms) would indicate a high likelihood of a favorable response to resynchronization therapy/biventricular pacemaker implantation.\par \par I have recommended the following\par 1. Consideration for biventricular pacemaker implantation dependent on the patient's clinical course as discussed above.\par \par \par \par \par Hypertension\par Hypertension is controlled on the present medical regimen.  Elevation of the blood pressure on initial examination today (150/86 mmHg) is attributed to a white coat effect.  In the setting of left ventricular systolic dysfunction beta blocker , ACE-I./ARB and Neprilysin inhibition    are attractive. .\par \par I have recommended the following:\par 1 low salt low fat low cholesterol diet. Regular aerobic exercise\par 2 continue the present  medical regimen.\par 3.  Home blood pressure monitoring\par \par \par \par \par Hyperlipidemia\par Hyperlipidemia represents a risk factor for progressive atherosclerotic disease. The target LDL level with known atherosclerotic heart disease is about 70. HMG CoA reductase  inhibitor therapy has been effective. In 11/22    the  serum cholesterol level was 122  HDL 49    LDL 46    and triglycerides 136. .Zetia to be deleted from the present medical regimen.  Non pharmacological  therapy, specifically diet exercise and weight loss are emphasized as major aspects of treatment.\par \par I have recommended the following:\par 1 low salt low fat low cholesterol diet. Regular aerobic exercise\par 2 discontinue  zetia \par 3 routine laboratory studies including lipid profile through primary care \par \par \par \par Obesity\par Obesity exacerbates Adarsh's cardiovascular issues.. Today Mr. Meyer is 5 feet 9 inches tall and weighs 222  lbs. . Diet exercise and weight loss are advised.\par \par \par \par The diagnosis, prognosis, risks options and alternatives were explained at length to the patient. All questions were answered. Issues discussed included cardio myopathy/left ventricular systolic dysfunction, heart failure, hyperlipidemia  hypertension  noninvasive cardiac testing diet and exercise.\par \par \par Counseling and coordination of care\par Time was a significant factor for this patient encounter. Total time spent with the patient was  30   minutes. 50% of the time was devoted to counseling and/or  coordination of care.

## 2023-04-19 ENCOUNTER — HOSPITAL ENCOUNTER (INPATIENT)
Dept: HOSPITAL 74 - FER | Age: 63
LOS: 1 days | Discharge: HOME | DRG: 603 | End: 2023-04-20
Attending: STUDENT IN AN ORGANIZED HEALTH CARE EDUCATION/TRAINING PROGRAM | Admitting: STUDENT IN AN ORGANIZED HEALTH CARE EDUCATION/TRAINING PROGRAM
Payer: COMMERCIAL

## 2023-04-19 VITALS — BODY MASS INDEX: 30.8 KG/M2

## 2023-04-19 VITALS — RESPIRATION RATE: 18 BRPM

## 2023-04-19 DIAGNOSIS — Z96.652: ICD-10-CM

## 2023-04-19 DIAGNOSIS — R50.9: ICD-10-CM

## 2023-04-19 DIAGNOSIS — I42.0: ICD-10-CM

## 2023-04-19 DIAGNOSIS — A60.00: ICD-10-CM

## 2023-04-19 DIAGNOSIS — I44.7: ICD-10-CM

## 2023-04-19 DIAGNOSIS — I25.10: ICD-10-CM

## 2023-04-19 DIAGNOSIS — G47.33: ICD-10-CM

## 2023-04-19 DIAGNOSIS — R50.81: ICD-10-CM

## 2023-04-19 DIAGNOSIS — L02.31: ICD-10-CM

## 2023-04-19 DIAGNOSIS — N20.0: ICD-10-CM

## 2023-04-19 DIAGNOSIS — I10: ICD-10-CM

## 2023-04-19 DIAGNOSIS — L03.317: Primary | ICD-10-CM

## 2023-04-19 DIAGNOSIS — E78.5: ICD-10-CM

## 2023-04-19 LAB
ALBUMIN SERPL-MCNC: 3.3 G/DL (ref 3.4–5)
ALP SERPL-CCNC: 48 U/L (ref 45–117)
ALT SERPL-CCNC: 27 U/L (ref 13–61)
ANION GAP SERPL CALC-SCNC: 10 MMOL/L (ref 8–16)
AST SERPL-CCNC: 32 U/L (ref 15–37)
BILIRUB SERPL-MCNC: 0.7 MG/DL (ref 0.2–1)
BUN SERPL-MCNC: 12 MG/DL (ref 7–18)
CALCIUM SERPL-MCNC: 8.5 MG/DL (ref 8.5–10)
CHLORIDE SERPL-SCNC: 103 MMOL/L (ref 98–107)
CO2 SERPL-SCNC: 25 MMOL/L (ref 21–32)
CREAT SERPL-MCNC: 0.9 MG/DL (ref 0.55–1.3)
DEPRECATED RDW RBC AUTO: 13.9 % (ref 11.9–15.9)
GLUCOSE SERPL-MCNC: 113 MG/DL (ref 74–106)
HCT VFR BLD CALC: 37.1 % (ref 35.4–49)
HGB BLD-MCNC: 12.4 G/DL (ref 11.7–16.9)
MCH RBC QN AUTO: 33.5 PG (ref 25.7–33.7)
MCHC RBC AUTO-ENTMCNC: 33.5 G/DL (ref 32–35.9)
MCV RBC: 99.9 FL (ref 80–96)
PLATELET # BLD AUTO: 203.3 10^3/UL (ref 134–434)
PMV BLD: 7.1 FL (ref 7.5–11.1)
PROT SERPL-MCNC: 6.4 G/DL (ref 6.4–8.2)
RBC # BLD AUTO: 3.71 10^6/UL (ref 4–5.6)
SODIUM SERPL-SCNC: 138 MMOL/L (ref 136–145)
WBC # BLD AUTO: 9.8 10^3/UL (ref 4–10.8)

## 2023-04-20 VITALS — HEART RATE: 74 BPM | DIASTOLIC BLOOD PRESSURE: 64 MMHG | TEMPERATURE: 98.5 F | SYSTOLIC BLOOD PRESSURE: 101 MMHG

## 2023-04-20 PROCEDURE — 0J973ZX DRAINAGE OF BACK SUBCUTANEOUS TISSUE AND FASCIA, PERCUTANEOUS APPROACH, DIAGNOSTIC: ICD-10-PCS | Performed by: SURGERY

## 2023-04-20 RX ADMIN — CARVEDILOL SCH MG: 12.5 TABLET, FILM COATED ORAL at 11:22

## 2023-04-20 RX ADMIN — PIPERACILLIN SODIUM,TAZOBACTAM SODIUM SCH MLS/HR: 3; .375 INJECTION, POWDER, FOR SOLUTION INTRAVENOUS at 02:01

## 2023-04-20 RX ADMIN — PIPERACILLIN SODIUM,TAZOBACTAM SODIUM SCH MLS/HR: 3; .375 INJECTION, POWDER, FOR SOLUTION INTRAVENOUS at 11:48

## 2023-04-20 RX ADMIN — SACUBITRIL AND VALSARTAN SCH TAB: 49; 51 TABLET, FILM COATED ORAL at 00:48

## 2023-04-20 RX ADMIN — SACUBITRIL AND VALSARTAN SCH TAB: 49; 51 TABLET, FILM COATED ORAL at 11:21

## 2023-04-20 RX ADMIN — CARVEDILOL SCH MG: 12.5 TABLET, FILM COATED ORAL at 00:47

## 2023-07-26 ENCOUNTER — APPOINTMENT (OUTPATIENT)
Dept: CARDIOLOGY | Facility: CLINIC | Age: 63
End: 2023-07-26
Payer: COMMERCIAL

## 2023-07-26 PROCEDURE — 93306 TTE W/DOPPLER COMPLETE: CPT

## 2023-07-30 DIAGNOSIS — Z86.79 PERSONAL HISTORY OF OTHER DISEASES OF THE CIRCULATORY SYSTEM: ICD-10-CM

## 2023-08-02 ENCOUNTER — APPOINTMENT (OUTPATIENT)
Dept: CARDIOLOGY | Facility: CLINIC | Age: 63
End: 2023-08-02
Payer: COMMERCIAL

## 2023-08-02 VITALS
OXYGEN SATURATION: 98 % | SYSTOLIC BLOOD PRESSURE: 153 MMHG | WEIGHT: 219 LBS | BODY MASS INDEX: 31.35 KG/M2 | DIASTOLIC BLOOD PRESSURE: 91 MMHG | HEIGHT: 70 IN | HEART RATE: 64 BPM

## 2023-08-02 PROCEDURE — 99214 OFFICE O/P EST MOD 30 MIN: CPT

## 2023-08-14 RX ORDER — INDOMETHACIN 50 MG/1
50 CAPSULE ORAL
Refills: 0 | Status: ACTIVE | COMMUNITY
Start: 2021-08-16

## 2023-08-14 NOTE — REVIEW OF SYSTEMS
[Feeling Fatigued] : feeling fatigued [Joint Pain] : joint pain [Negative] : Heme/Lymph [FreeTextEntry5] : see history of present illness [FreeTextEntry3] : reading glasses

## 2023-08-14 NOTE — PHYSICAL EXAM
[Normal] : normal conjunctiva [Normal S1, S2] : normal S1, S2 [Clear Lung Fields] : clear lung fields [Non Tender] : non-tender [Normal Bowel Sounds] : normal bowel sounds [Normal Gait] : normal gait [No Rash] : no rash [No Focal Deficits] : no focal deficits [de-identified] : Blood pressure 150/90 mmHg respiration 16/minute heart rate 64/minute oxygen saturation is 98% weight 219 pounds  appears somewhat overweight lying flat in no distress [de-identified] : no  neck vein distention.  no carotid  buit [de-identified] : no murmur.  no gallop  no diastolic sounds [de-identified] : full range of   motion [de-identified] : dorsalis pedis pulses +2 bilaterally. no edema [de-identified] : pleasant [Normal Conjunctiva] : the conjunctiva exhibited no abnormalities [Auscultation Breath Sounds / Voice Sounds] : lungs were clear to auscultation bilaterally [Heart Rate And Rhythm] : heart rate and rhythm were normal [Heart Sounds] : normal S1 and S2 [Murmurs] : no murmurs present [Arterial Pulses Normal] : the arterial pulses were normal [Edema] : no peripheral edema present [Bowel Sounds] : normal bowel sounds [Abdomen Soft] : soft [Abnormal Walk] : normal gait [Cyanosis, Localized] : no localized cyanosis [] : no rash [Affect] : the affect was normal [FreeTextEntry1] : pleasant

## 2023-08-14 NOTE — HISTORY OF PRESENT ILLNESS
[FreeTextEntry1] : 62-year-old man Routine follow-up for atherosclerotic heart disease cardiomyopathy hypertension hyperlipidemia and obesity.  AJ  states that he feels well.  He specifically denies chest pain, shortness of breath, palpitations ankle edema orthopnea or syncope.

## 2023-08-14 NOTE — DISCUSSION/SUMMARY
[FreeTextEntry1] : Cardiomyopathy The working diagnosis is a nonischemic dilated cardiomyopathy. The degree of coronary disease  (10/09 coronary arteriogram 50% LAD patent circumflex and RCA , normal perfusion   Lexiscan sesta mibi study )  is inadequate to explain the degree of left ventricular systolic dysfunction. Left ventricular systolic function is depressed as reflected by a left ventricular ejection fraction of  37%  on the   gated sestamibi study and 41 % on the     echocardiogram. There is no clinical congestive heart failure. Beta blocker and ACE-I /ARB  therapy are attractive. Recent data have demonstrated the benefit of Neprilysin inhibition in patients with significant left ventricular systolic dysfunction . Relatively low blood pressure levels may make higher doses of Entresto problematic.  The presence of a left bundle branch block with a wide QRS complex (150 ms) would indicate a favorable response to resynchronization therapy/biventricular pacemaker implantation .In light of the lack of symptoms and above noninvasive studies continuing the present medical therapy is indicated    I have recommended the followin increase Entresto  dose to 97/103 mg  bid   2 . Consideration for biventricular pacemaker in the future depending on the patient's clinical course,  3 No further cardiac testing for this problem at this time      Atherosclerotic heart disease Mr. Meyer has known atherosclerotic heart disease. A 10/09 coronary arteriogram demonstrated a 50% LAD stenosis. The left circumflex and RCA were patent. The left ventriculogram demonstrated an ejection fraction of 57%.  A    Lexiscan sestamibi study demonstrated normal perfusion with no ischemia ..  Left ventricular systolic function  is depressed  as reflected by a left ventricle ejection fraction of 37 % on the    gated sestamibi study and 41  % on the     echocardiogram. In light of the lack of angina and above noninvasive studies continued medical management is indicated at this time..  I have recommended the followin risk factor modification 2 continue the present medical regimen 3 No further cardiac testing for this problem at this time        Left bundle branch block Mr. Meyer has a known chronic left bundle branch block. The presence of a left bundle branch block is common in the setting of a cardiomyopathy. As previously noted a left bundle branch block with wide QRS duration ( 150 ms) would indicate a high likelihood of a favorable response to resynchronization therapy/biventricular pacemaker implantation.  I have recommended the following 1. Consideration for biventricular pacemaker implantation dependent on the patient's clinical course as discussed above.     Hypertension Hypertension is not optimally  controlled on the present medical regimen.  Elevation of the blood pressure on initial examination today (150/90 mmHg)   may in part be  attributed to a white coat effect.  In the setting of left ventricular systolic dysfunction beta blocker , ACE-I./ARB and Neprilysin inhibition    are attractive. .  I have recommended the followin low salt low fat low cholesterol diet. Regular aerobic exercise 2 Increase Entresto dose from 49-51 mg twice daily to 97/103 mg twice daily. 3.  Home blood pressure monitoring 4. Laboratory studies including renal function test and electrolytes through primary care Dr. Oviedo    Hyperlipidemia Hyperlipidemia represents a risk factor for progressive atherosclerotic disease. The target LDL level with known atherosclerotic heart disease is about 70. HMG CoA reductase  inhibitor therapy has been effective. In     the  serum cholesterol level was 122  HDL 49    LDL 46    and triglycerides 136. .Zetia t was then  be deleted from the  medical regimen.  Non pharmacological  therapy, specifically diet exercise and weight loss are emphasized as major aspects of treatment.  I have recommended the followin low salt low fat low cholesterol diet. Regular aerobic exercise 2  routine laboratory studies including lipid profile through primary care  Dr Oviedo    Obesity Obesity exacerbates Adarsh's cardiovascular issues.. Today Mr. Meyer is 5 feet 9 inches tall and weighs 219  lbs. . Diet exercise and weight loss are advised.    The diagnosis, prognosis, risks options and alternatives were explained at length to the patient. All questions were answered. Issues discussed included cardio myopathy/left ventricular systolic dysfunction, heart failure, hyperlipidemia  hypertension  noninvasive cardiac testing diet and exercise.   Counseling and coordination of care Time was a significant factor for this patient encounter. Total time spent with the patient was  30   minutes. 50% of the time was devoted to counseling and/or  coordination of care.

## 2024-02-02 ENCOUNTER — APPOINTMENT (OUTPATIENT)
Dept: CARDIOLOGY | Facility: CLINIC | Age: 64
End: 2024-02-02
Payer: COMMERCIAL

## 2024-02-02 ENCOUNTER — NON-APPOINTMENT (OUTPATIENT)
Age: 64
End: 2024-02-02

## 2024-02-02 VITALS
BODY MASS INDEX: 31.42 KG/M2 | HEART RATE: 64 BPM | WEIGHT: 219 LBS | SYSTOLIC BLOOD PRESSURE: 136 MMHG | DIASTOLIC BLOOD PRESSURE: 79 MMHG | OXYGEN SATURATION: 97 %

## 2024-02-02 DIAGNOSIS — I44.7 LEFT BUNDLE-BRANCH BLOCK, UNSPECIFIED: ICD-10-CM

## 2024-02-02 DIAGNOSIS — E66.9 OBESITY, UNSPECIFIED: ICD-10-CM

## 2024-02-02 DIAGNOSIS — E78.5 HYPERLIPIDEMIA, UNSPECIFIED: ICD-10-CM

## 2024-02-02 DIAGNOSIS — I10 ESSENTIAL (PRIMARY) HYPERTENSION: ICD-10-CM

## 2024-02-02 DIAGNOSIS — I42.9 CARDIOMYOPATHY, UNSPECIFIED: ICD-10-CM

## 2024-02-02 DIAGNOSIS — I25.10 ATHEROSCLEROTIC HEART DISEASE OF NATIVE CORONARY ARTERY W/OUT ANGINA PECTORIS: ICD-10-CM

## 2024-02-02 PROCEDURE — 93000 ELECTROCARDIOGRAM COMPLETE: CPT

## 2024-02-02 PROCEDURE — 99213 OFFICE O/P EST LOW 20 MIN: CPT | Mod: 25

## 2024-02-03 ENCOUNTER — NON-APPOINTMENT (OUTPATIENT)
Age: 64
End: 2024-02-03

## 2024-02-03 PROBLEM — I44.7 LEFT BUNDLE BRANCH BLOCK: Status: ACTIVE | Noted: 2019-12-27

## 2024-02-03 PROBLEM — I42.9 CARDIOMYOPATHY: Status: ACTIVE | Noted: 2019-01-30

## 2024-02-03 PROBLEM — I25.10 ATHEROSCLEROTIC HEART DISEASE: Status: ACTIVE | Noted: 2019-01-30

## 2024-02-03 PROBLEM — E66.9 OBESITY: Status: ACTIVE | Noted: 2020-01-23

## 2024-02-03 PROBLEM — E78.5 HYPERLIPIDEMIA: Status: ACTIVE | Noted: 2019-02-03

## 2024-02-03 PROBLEM — I10 HYPERTENSION: Status: ACTIVE | Noted: 2019-02-03

## 2024-02-03 NOTE — DISCUSSION/SUMMARY
[FreeTextEntry1] : Cardiomyopathy The working diagnosis is a nonischemic dilated cardiomyopathy. The degree of coronary disease  (10/09 coronary arteriogram 50% LAD patent circumflex and RCA , normal perfusion   Lexiscan sesta mibi study )  is inadequate to explain the degree of left ventricular systolic dysfunction. Left ventricular systolic function is depressed as reflected by a left ventricular ejection fraction of  37%  on the   gated sestamibi study and 41 % on the     echocardiogram. There is no clinical congestive heart failure. Beta blocker and ACE-I /ARB  therapy are attractive. Recent data have demonstrated the benefit of Neprilysin inhibition in patients with significant left ventricular systolic dysfunction . Relatively low blood pressure levels may make higher doses of Entresto problematic.  The presence of a left bundle branch block with a wide QRS complex (158 ms) would indicate a favorable response to resynchronization therapy/biventricular pacemaker implantation .In light of the lack of symptoms and above noninvasive studies continuing the present medical therapy is indicated    I have recommended the followin increase Entresto  dose to 97/103 mg  bid   2 . Consideration for biventricular pacemaker in the future depending on the patient's clinical course,  3 No further cardiac testing for this problem at this time 4 Echocardiogram with next office evaluation     Atherosclerotic heart disease Mr. Meyer has known atherosclerotic heart disease. A 10/09 coronary arteriogram demonstrated a 50% LAD stenosis. The left circumflex and RCA were patent. The left ventriculogram demonstrated an ejection fraction of 57%.  A    Lexiscan sestamibi study demonstrated normal perfusion with no ischemia ..  Left ventricular systolic function  is depressed  as reflected by a left ventricle ejection fraction of 37 % on the    gated sestamibi study and 41  % on the     echocardiogram. In light of the lack of angina and above noninvasive studies continued medical management is indicated at this time..  I have recommended the followin risk factor modification 2 continue the present medical regimen 3 No further cardiac testing for this problem at this time 4. Echocardiogram with next office evaluation      Left bundle branch block Mr. Meyer has a known chronic left bundle branch block. The presence of a left bundle branch block is common in the setting of a cardiomyopathy. As previously noted a left bundle branch block with wide QRS duration ( 158 ms) would indicate a high likelihood of a favorable response to resynchronization therapy/biventricular pacemaker implantation.  I have recommended the following 1. Consideration for biventricular pacemaker implantation dependent on the patient's clinical course as discussed above.     Hypertension Hypertension is not optimally  controlled on the present medical regimen.    In the setting of left ventricular systolic dysfunction beta blocker , ACE-I./ARB and Neprilysin inhibition    are attractive. .  I have recommended the followin low salt low fat low cholesterol diet. Regular aerobic exercise 2 Continue the present medical regimen 3.  Home blood pressure monitoring 4. Laboratory studies including renal function test and electrolytes through primary care / [facility]    Hyperlipidemia Hyperlipidemia represents a risk factor for progressive atherosclerotic disease. The target LDL level with known atherosclerotic heart disease is about 70. HMG CoA reductase  inhibitor therapy has been effective. In     the  serum cholesterol level was 122  HDL 49    LDL 46    and triglycerides 136. .Zetia  was then  be deleted from the  medical regimen.  Non pharmacological  therapy, specifically diet exercise and weight loss are emphasized as major aspects of treatment.  I have recommended the followin low salt low fat low cholesterol diet. Regular aerobic exercise 2  routine laboratory studies including lipid profile through primary care  Gloria Oviedo    Obesity Obesity exacerbates Adarsh's cardiovascular issues.. Today Mr. Meyer is 5 feet 9 inches tall and weighs 219  lbs. . Diet exercise and weight loss are advised.    The diagnosis, prognosis, risks options and alternatives were explained at length to the patient. All questions were answered. Issues discussed included cardio myopathy/left ventricular systolic dysfunction, heart failure, hyperlipidemia  hypertension  noninvasive cardiac testing diet and exercise.   Counseling and coordination of care Time was a significant factor for this patient encounter. Total time spent with the patient was  25   minutes. 50% of the time was devoted to counseling and/or  coordination of care.

## 2024-02-03 NOTE — PHYSICAL EXAM
[Normal] : normal conjunctiva [Normal S1, S2] : normal S1, S2 [Clear Lung Fields] : clear lung fields [Non Tender] : non-tender [Normal Bowel Sounds] : normal bowel sounds [Normal Gait] : normal gait [No Rash] : no rash [No Focal Deficits] : no focal deficits [Normal Conjunctiva] : the conjunctiva exhibited no abnormalities [Auscultation Breath Sounds / Voice Sounds] : lungs were clear to auscultation bilaterally [Heart Rate And Rhythm] : heart rate and rhythm were normal [Heart Sounds] : normal S1 and S2 [Murmurs] : no murmurs present [Arterial Pulses Normal] : the arterial pulses were normal [Edema] : no peripheral edema present [Bowel Sounds] : normal bowel sounds [Abdomen Soft] : soft [Abnormal Walk] : normal gait [Cyanosis, Localized] : no localized cyanosis [] : no rash [Affect] : the affect was normal [FreeTextEntry1] : pleasant [Soft] : abdomen soft [de-identified] :   appears somewhat overweight lying flat in no distress [de-identified] : no  neck vein distention.  no carotid  buit [de-identified] : no murmur.  no gallop  no diastolic sounds [de-identified] : full range of   motion [de-identified] : dorsalis pedis pulses +2 bilaterally. no edema [de-identified] : pleasant

## 2024-02-03 NOTE — HISTORY OF PRESENT ILLNESS
[FreeTextEntry1] :  " 63-year-old man Routine follow-up  for atherosclerotic heart disease cardiomyopathy hyperlipidemia hypertension left bundle branch block and obesity  "I feel okay."  Adarsh   denies chest pain, shortness of breath, palpitations or syncope.  He is physically active without restriction or limitation. Mr. Meyer continues to struggle with his weight

## 2024-02-05 RX ORDER — ROSUVASTATIN CALCIUM 40 MG/1
40 TABLET, FILM COATED ORAL
Qty: 90 | Refills: 3 | Status: ACTIVE | COMMUNITY
Start: 2020-01-22

## 2024-02-05 RX ORDER — SACUBITRIL AND VALSARTAN 97; 103 MG/1; MG/1
97-103 TABLET, FILM COATED ORAL
Qty: 180 | Refills: 3 | Status: ACTIVE | COMMUNITY
Start: 2023-10-26

## 2024-03-01 ENCOUNTER — NON-APPOINTMENT (OUTPATIENT)
Age: 64
End: 2024-03-01

## 2024-06-06 RX ORDER — B-COMPLEX WITH VITAMIN C
TABLET ORAL
Refills: 0 | Status: ACTIVE | COMMUNITY

## 2024-06-06 RX ORDER — VITAMIN E ACID SUCCINATE 268 MG
TABLET ORAL
Refills: 0 | Status: ACTIVE | COMMUNITY

## 2024-07-29 ENCOUNTER — APPOINTMENT (OUTPATIENT)
Dept: CARDIOLOGY | Facility: CLINIC | Age: 64
End: 2024-07-29
Payer: COMMERCIAL

## 2024-07-29 PROCEDURE — 93306 TTE W/DOPPLER COMPLETE: CPT

## 2024-07-30 DIAGNOSIS — Z86.79 PERSONAL HISTORY OF OTHER DISEASES OF THE CIRCULATORY SYSTEM: ICD-10-CM

## 2024-08-02 DIAGNOSIS — R06.00 DYSPNEA, UNSPECIFIED: ICD-10-CM

## 2024-08-03 ENCOUNTER — RESULT REVIEW (OUTPATIENT)
Age: 64
End: 2024-08-03

## 2024-08-05 ENCOUNTER — NON-APPOINTMENT (OUTPATIENT)
Age: 64
End: 2024-08-05

## 2024-08-05 ENCOUNTER — APPOINTMENT (OUTPATIENT)
Dept: CARDIOLOGY | Facility: CLINIC | Age: 64
End: 2024-08-05

## 2024-08-05 PROCEDURE — 93000 ELECTROCARDIOGRAM COMPLETE: CPT

## 2024-08-05 PROCEDURE — 99214 OFFICE O/P EST MOD 30 MIN: CPT | Mod: 25

## 2024-08-06 ENCOUNTER — NON-APPOINTMENT (OUTPATIENT)
Age: 64
End: 2024-08-06

## 2024-08-06 PROBLEM — Z87.438 HISTORY OF ERECTILE DYSFUNCTION: Status: RESOLVED | Noted: 2024-08-06 | Resolved: 2024-08-06

## 2024-08-06 NOTE — HISTORY OF PRESENT ILLNESS
[FreeTextEntry1] : 63-year-old man Routine follow-up for cardiomyopathy left bundle branch block hypertension hyperlipidemia atherosclerotic heart disease and obesity.  AJ reported an episode on 5/2/2024 of lightheadedness and dyspnea at rest.  The symptoms abated spontaneously he presently feels well and in particular denies chest pain shortness of breath ankle edema orthopnea or syncope.

## 2024-08-06 NOTE — DISCUSSION/SUMMARY
[FreeTextEntry1] : Cardiomyopathy The working diagnosis is a nonischemic dilated cardiomyopathy. The degree of coronary disease  (10/09 coronary arteriogram 50% LAD patent circumflex and RCA , normal perfusion   Lexiscan sesta mibi study )  is inadequate to explain the degree of left ventricular systolic dysfunction. Left ventricular systolic function is depressed as reflected by a left ventricular ejection fraction of  37%  on the   gated sestamibi study and 34 % on the     echocardiogram. There is no clinical congestive heart failure. Beta blocker , Neprilysin inhibition and ACE-I /ARB  therapy are attractive. Recent data have demonstrated the benefit of  SGLT2 inhibition in patients with significant left ventricular systolic dysfunction . Relatively low blood pressure levels may make higher doses of carvedilol  problematic.  The presence of a left bundle branch block with a wide QRS complex (147 ms) would indicate a favorable response to resynchronization therapy/biventricular pacemaker implantation  Further noninvasive studies/cardiac MRI would be helpful to rule out any infiltrative disease that might be responsible for the decline in left ventricular systolic function.    I have recommended the followin   Farxiga 10 mg/day added to the present medical regimen 2 . Consideration for biventricular pacemaker in the future depending on the patient's clinical course,  3 Cardiac MRI study  as discussed above      Atherosclerotic heart disease Mr. Meyer has known atherosclerotic heart disease. A 10/09 coronary arteriogram demonstrated a 50% LAD stenosis. The left circumflex and RCA were patent. The left ventriculogram demonstrated an ejection fraction of 57%.  A    Lexiscan sestamibi study demonstrated normal perfusion with no ischemia ..  Left ventricular systolic function  is depressed  as reflected by a left ventricle ejection fraction of 37 % on the    gated sestamibi study and  34  % on the     echocardiogram. In light of the lack of angina and above noninvasive studies continued medical management is indicated at this time..  I have recommended the followin risk factor modification 2 continue the present medical regimen 3 Cardiac MRI study (see cardiomyopathy entry above)      Left bundle branch block Mr. Meyer has a known chronic left bundle branch block. The presence of a left bundle branch block is common in the setting of a cardiomyopathy. As previously noted a left bundle branch block with wide QRS duration ( 147 ms) would indicate a high likelihood of a favorable response to resynchronization therapy/biventricular pacemaker implantation.  I have recommended the following 1. Consideration for biventricular pacemaker implantation dependent on the patient's clinical course as discussed above.     Hypertension Hypertension is   controlled on the present medical regimen.    In the setting of left ventricular systolic dysfunction beta blocker , ACE-I./ARB and Neprilysin inhibition    are attractive. .  I have recommended the followin low salt low fat low cholesterol diet. Regular aerobic exercise 2 .  Home blood pressure monitoring 4. Laboratory studies including renal function test  glucose level and electrolytes are ordered.     Hyperlipidemia Hyperlipidemia represents a risk factor for progressive atherosclerotic disease. The target LDL level with known atherosclerotic heart disease is about 70. HMG CoA reductase  inhibitor therapy has been effective. In     the  serum cholesterol level was 122  HDL 49    LDL 46    and triglycerides 136. .Zetia  was then  be deleted from the  medical regimen. More recent lipid levels are not available for review Non pharmacological  therapy, specifically diet exercise and weight loss are emphasized as major aspects of treatment.  I have recommended the followin low salt low fat low cholesterol diet. Regular aerobic exercise 2  routine laboratory studies including lipid profile  are ordered    Obesity Obesity exacerbates Adarsh's cardiovascular issues.. Today Mr. Meyer is 5 feet 9 inches tall and weighs 212  lbs. . Diet exercise and weight loss are advised.    The diagnosis, prognosis, risks options and alternatives were explained at length to the patient. All questions were answered. Issues discussed included cardio myopathy/left ventricular systolic dysfunction, heart failure, hyperlipidemia  hypertension  noninvasive cardiac testing diet and exercise.   Counseling and coordination of care Time was a significant factor for this patient encounter. Total time spent with the patient was  30   minutes. 50% of the time was devoted to counseling and/or  coordination of care.

## 2024-08-06 NOTE — PHYSICAL EXAM
[Normal] : normal conjunctiva [Normal S1, S2] : normal S1, S2 [Clear Lung Fields] : clear lung fields [Soft] : abdomen soft [Non Tender] : non-tender [Normal Bowel Sounds] : normal bowel sounds [Normal Gait] : normal gait [No Rash] : no rash [No Focal Deficits] : no focal deficits [de-identified] :   appears somewhat overweight lying flat in no distress [de-identified] : no murmur.  no gallop  no diastolic sounds [de-identified] : no  neck vein distention.  no carotid  buit [de-identified] : full range of   motion [de-identified] : dorsalis pedis pulses +2 bilaterally. no edema [de-identified] : pleasant

## 2024-08-06 NOTE — PHYSICAL EXAM
[Normal] : normal conjunctiva [Normal S1, S2] : normal S1, S2 [Clear Lung Fields] : clear lung fields [Soft] : abdomen soft [Non Tender] : non-tender [Normal Bowel Sounds] : normal bowel sounds [Normal Gait] : normal gait [No Rash] : no rash [No Focal Deficits] : no focal deficits [de-identified] :   appears somewhat overweight lying flat in no distress [de-identified] : no murmur.  no gallop  no diastolic sounds [de-identified] : no  neck vein distention.  no carotid  buit [de-identified] : full range of   motion [de-identified] : pleasant [de-identified] : dorsalis pedis pulses +2 bilaterally. no edema

## 2024-09-30 ENCOUNTER — RESULT REVIEW (OUTPATIENT)
Age: 64
End: 2024-09-30

## 2024-09-30 DIAGNOSIS — Z86.79 PERSONAL HISTORY OF OTHER DISEASES OF THE CIRCULATORY SYSTEM: ICD-10-CM

## 2024-11-01 RX ORDER — DAPAGLIFLOZIN 10 MG/1
10 TABLET, FILM COATED ORAL
Qty: 90 | Refills: 3 | Status: ACTIVE | COMMUNITY
Start: 2024-11-01 | End: 1900-01-01

## 2024-11-19 ENCOUNTER — NON-APPOINTMENT (OUTPATIENT)
Age: 64
End: 2024-11-19

## 2024-11-19 ENCOUNTER — APPOINTMENT (OUTPATIENT)
Dept: CARDIOLOGY | Facility: CLINIC | Age: 64
End: 2024-11-19
Payer: COMMERCIAL

## 2024-11-19 VITALS
DIASTOLIC BLOOD PRESSURE: 78 MMHG | OXYGEN SATURATION: 98 % | SYSTOLIC BLOOD PRESSURE: 132 MMHG | HEIGHT: 70 IN | WEIGHT: 196 LBS | BODY MASS INDEX: 28.06 KG/M2 | HEART RATE: 66 BPM

## 2024-11-19 DIAGNOSIS — E78.5 HYPERLIPIDEMIA, UNSPECIFIED: ICD-10-CM

## 2024-11-19 DIAGNOSIS — Z86.79 PERSONAL HISTORY OF OTHER DISEASES OF THE CIRCULATORY SYSTEM: ICD-10-CM

## 2024-11-19 DIAGNOSIS — I44.7 LEFT BUNDLE-BRANCH BLOCK, UNSPECIFIED: ICD-10-CM

## 2024-11-19 DIAGNOSIS — E66.9 OBESITY, UNSPECIFIED: ICD-10-CM

## 2024-11-19 DIAGNOSIS — I25.10 ATHEROSCLEROTIC HEART DISEASE OF NATIVE CORONARY ARTERY W/OUT ANGINA PECTORIS: ICD-10-CM

## 2024-11-19 DIAGNOSIS — I10 ESSENTIAL (PRIMARY) HYPERTENSION: ICD-10-CM

## 2024-11-19 DIAGNOSIS — I42.9 CARDIOMYOPATHY, UNSPECIFIED: ICD-10-CM

## 2024-11-19 PROCEDURE — 93000 ELECTROCARDIOGRAM COMPLETE: CPT

## 2024-11-19 PROCEDURE — 99214 OFFICE O/P EST MOD 30 MIN: CPT | Mod: 25

## 2024-11-22 ENCOUNTER — NON-APPOINTMENT (OUTPATIENT)
Age: 64
End: 2024-11-22

## 2024-11-22 PROBLEM — Z86.79 HISTORY OF HEART VALVE ABNORMALITY: Status: RESOLVED | Noted: 2019-02-03 | Resolved: 2024-11-22

## 2024-11-22 PROBLEM — Z86.79 HISTORY OF CORONARY ATHEROSCLEROSIS: Status: RESOLVED | Noted: 2019-02-03 | Resolved: 2024-11-22

## 2024-11-22 RX ORDER — VITAMIN E ACID SUCCINATE 268 MG
TABLET ORAL
Refills: 0 | Status: ACTIVE | COMMUNITY

## 2025-01-21 RX ORDER — ROSUVASTATIN CALCIUM 40 MG/1
40 TABLET, FILM COATED ORAL DAILY
Qty: 90 | Refills: 3 | Status: ACTIVE | COMMUNITY
Start: 2025-01-21 | End: 1900-01-01

## 2025-05-01 ENCOUNTER — APPOINTMENT (OUTPATIENT)
Dept: CARDIOLOGY | Facility: CLINIC | Age: 65
End: 2025-05-01
Payer: COMMERCIAL

## 2025-05-01 ENCOUNTER — NON-APPOINTMENT (OUTPATIENT)
Age: 65
End: 2025-05-01

## 2025-05-01 DIAGNOSIS — I44.7 LEFT BUNDLE-BRANCH BLOCK, UNSPECIFIED: ICD-10-CM

## 2025-05-01 DIAGNOSIS — I42.9 CARDIOMYOPATHY, UNSPECIFIED: ICD-10-CM

## 2025-05-01 PROCEDURE — 93306 TTE W/DOPPLER COMPLETE: CPT

## 2025-05-02 DIAGNOSIS — Z86.79 PERSONAL HISTORY OF OTHER DISEASES OF THE CIRCULATORY SYSTEM: ICD-10-CM

## 2025-05-16 ENCOUNTER — NON-APPOINTMENT (OUTPATIENT)
Age: 65
End: 2025-05-16

## 2025-05-16 ENCOUNTER — APPOINTMENT (OUTPATIENT)
Dept: CARDIOLOGY | Facility: CLINIC | Age: 65
End: 2025-05-16
Payer: COMMERCIAL

## 2025-05-16 VITALS
DIASTOLIC BLOOD PRESSURE: 67 MMHG | SYSTOLIC BLOOD PRESSURE: 111 MMHG | WEIGHT: 193 LBS | HEART RATE: 67 BPM | BODY MASS INDEX: 27.69 KG/M2 | OXYGEN SATURATION: 96 %

## 2025-05-16 DIAGNOSIS — I42.9 CARDIOMYOPATHY, UNSPECIFIED: ICD-10-CM

## 2025-05-16 DIAGNOSIS — E66.9 OBESITY, UNSPECIFIED: ICD-10-CM

## 2025-05-16 DIAGNOSIS — I10 ESSENTIAL (PRIMARY) HYPERTENSION: ICD-10-CM

## 2025-05-16 DIAGNOSIS — E78.5 HYPERLIPIDEMIA, UNSPECIFIED: ICD-10-CM

## 2025-05-16 DIAGNOSIS — I25.10 ATHEROSCLEROTIC HEART DISEASE OF NATIVE CORONARY ARTERY W/OUT ANGINA PECTORIS: ICD-10-CM

## 2025-05-16 DIAGNOSIS — I44.7 LEFT BUNDLE-BRANCH BLOCK, UNSPECIFIED: ICD-10-CM

## 2025-05-16 PROCEDURE — 99213 OFFICE O/P EST LOW 20 MIN: CPT

## 2025-05-16 PROCEDURE — G2211 COMPLEX E/M VISIT ADD ON: CPT | Mod: NC

## 2025-05-16 PROCEDURE — 93000 ELECTROCARDIOGRAM COMPLETE: CPT
